# Patient Record
Sex: FEMALE | Race: BLACK OR AFRICAN AMERICAN | NOT HISPANIC OR LATINO | Employment: OTHER | URBAN - METROPOLITAN AREA
[De-identification: names, ages, dates, MRNs, and addresses within clinical notes are randomized per-mention and may not be internally consistent; named-entity substitution may affect disease eponyms.]

---

## 2017-05-23 ENCOUNTER — HOSPITAL ENCOUNTER (OUTPATIENT)
Dept: RADIOLOGY | Facility: HOSPITAL | Age: 82
Discharge: HOME | End: 2017-05-23
Attending: PODIATRIST
Payer: MEDICARE

## 2017-05-23 ENCOUNTER — OFFICE VISIT (OUTPATIENT)
Dept: PODIATRY | Facility: CLINIC | Age: 82
End: 2017-05-23
Payer: MEDICARE

## 2017-05-23 VITALS
WEIGHT: 137 LBS | HEIGHT: 63 IN | HEART RATE: 86 BPM | BODY MASS INDEX: 24.27 KG/M2 | DIASTOLIC BLOOD PRESSURE: 81 MMHG | SYSTOLIC BLOOD PRESSURE: 105 MMHG

## 2017-05-23 DIAGNOSIS — I73.9 PVD (PERIPHERAL VASCULAR DISEASE): ICD-10-CM

## 2017-05-23 DIAGNOSIS — M79.671 RIGHT FOOT PAIN: Primary | ICD-10-CM

## 2017-05-23 DIAGNOSIS — B35.1 DERMATOPHYTOSIS OF NAIL: Primary | ICD-10-CM

## 2017-05-23 DIAGNOSIS — E11.42 DM TYPE 2 WITH DIABETIC PERIPHERAL NEUROPATHY: ICD-10-CM

## 2017-05-23 DIAGNOSIS — M79.671 RIGHT FOOT PAIN: ICD-10-CM

## 2017-05-23 PROCEDURE — 1159F MED LIST DOCD IN RCRD: CPT | Mod: S$GLB,,, | Performed by: PODIATRIST

## 2017-05-23 PROCEDURE — 11721 DEBRIDE NAIL 6 OR MORE: CPT | Mod: Q8,S$GLB,, | Performed by: PODIATRIST

## 2017-05-23 PROCEDURE — 99999 PR PBB SHADOW E&M-NEW PATIENT-LVL III: CPT | Mod: PBBFAC,,, | Performed by: PODIATRIST

## 2017-05-23 PROCEDURE — 1125F AMNT PAIN NOTED PAIN PRSNT: CPT | Mod: S$GLB,,, | Performed by: PODIATRIST

## 2017-05-23 PROCEDURE — 99203 OFFICE O/P NEW LOW 30 MIN: CPT | Mod: 25,S$GLB,, | Performed by: PODIATRIST

## 2017-05-23 PROCEDURE — 1160F RVW MEDS BY RX/DR IN RCRD: CPT | Mod: S$GLB,,, | Performed by: PODIATRIST

## 2017-05-23 PROCEDURE — 73630 X-RAY EXAM OF FOOT: CPT | Mod: 26,RT,, | Performed by: RADIOLOGY

## 2017-05-23 RX ORDER — SULFAMETHOXAZOLE AND TRIMETHOPRIM 400; 80 MG/1; MG/1
1 TABLET ORAL 2 TIMES DAILY
COMMUNITY
End: 2018-03-05

## 2017-05-23 RX ORDER — GABAPENTIN 300 MG/1
300 CAPSULE ORAL 3 TIMES DAILY
COMMUNITY
End: 2018-03-05

## 2017-05-23 RX ORDER — CYCLOBENZAPRINE HCL 10 MG
10 TABLET ORAL 3 TIMES DAILY PRN
COMMUNITY
End: 2018-11-08 | Stop reason: SDUPTHER

## 2017-05-23 RX ORDER — FERROUS GLUCONATE 324(38)MG
324 TABLET ORAL
COMMUNITY
End: 2017-11-01

## 2017-05-23 RX ORDER — CHOLECALCIFEROL (VITAMIN D3) 1250 MCG
TABLET ORAL
COMMUNITY
End: 2018-03-05

## 2017-05-23 RX ORDER — CLOPIDOGREL BISULFATE 75 MG/1
75 TABLET ORAL DAILY
COMMUNITY
End: 2017-11-10 | Stop reason: SDUPTHER

## 2017-05-23 RX ORDER — HYDROCODONE BITARTRATE AND ACETAMINOPHEN 7.5; 325 MG/1; MG/1
1 TABLET ORAL EVERY 6 HOURS PRN
COMMUNITY
End: 2019-03-22

## 2017-05-23 RX ORDER — FUROSEMIDE 20 MG/1
20 TABLET ORAL 2 TIMES DAILY
COMMUNITY
End: 2018-02-19 | Stop reason: SDUPTHER

## 2017-05-23 RX ORDER — ATORVASTATIN CALCIUM 10 MG/1
20 TABLET, FILM COATED ORAL DAILY
COMMUNITY
End: 2017-11-18 | Stop reason: SDUPTHER

## 2017-05-23 NOTE — PROGRESS NOTES
[facility]  PODIATRIC MEDICINE AND SURGERY  PROGRESS NOTE  5/23/2017    PODIATRY NOTE  PCP: Dr. Sabrina Mendoza MD    CHIEF COMPLAINT   Chief Complaint   Patient presents with    Foot Pain     right foot pain mostly in toes current pain 8/10 worse at night describes and numbing, sharp pain    Diabetes Mellitus     PCP Dr. ZAIRA Mendoza       HPI  Concepcion Wright is a 82 y.o. female who has a past medical history of Diabetes mellitus; Hyperlipidemia; Hypertension; and Spinal stenosis.     Concepcion presents to clinic today complaining of right foot pain.    Patient describes pain as:   Location: right foot toes- primarily great toe   Quality: sharp shooting and numbness   Severity:8/10  Duration:several months   Modifying Factors (Aggravating): worse at night   Modifying Factors (Alleviating): no treatment. She was referred by her PCP for further evaluation. She is scheduled to see vascular surgeon due to poor blood flow. She has had two bypasses. She complains about thickened elongated toenails. She states great toenail on right foot particularly painful.     Patient denies other pedal complaints at this time.      PMH  Past Medical History:   Diagnosis Date    Diabetes mellitus     Hyperlipidemia     Hypertension     Spinal stenosis        PROBLEM LIST  There are no active problems to display for this patient.      MEDS  No current outpatient prescriptions on file prior to visit.     No current facility-administered medications on file prior to visit.        Medication List with Changes/Refills   Current Medications    AMLODIPINE BESYLATE/BENAZEPRIL (AMLODIPINE-BENAZEPRIL ORAL)    Take 10 mg by mouth.    ATORVASTATIN (LIPITOR) 10 MG TABLET    Take 10 mg by mouth once daily.    CHOLECALCIFEROL, VITAMIN D3, 50,000 UNIT TAB    Take by mouth.    CLOPIDOGREL (PLAVIX) 75 MG TABLET    Take 75 mg by mouth once daily.    CYCLOBENZAPRINE (FLEXERIL) 10 MG TABLET    Take 10 mg by mouth 3 (three) times daily  "as needed for Muscle spasms.    FERROUS GLUCONATE (FERGON) 324 MG TABLET    Take 324 mg by mouth daily with breakfast.    FUROSEMIDE (LASIX) 20 MG TABLET    Take 20 mg by mouth 2 (two) times daily.    GABAPENTIN (NEURONTIN) 300 MG CAPSULE    Take 300 mg by mouth 3 (three) times daily.    HYDROCODONE-ACETAMINOPHEN 7.5-325MG (NORCO) 7.5-325 MG PER TABLET    Take 1 tablet by mouth every 6 (six) hours as needed for Pain.    LINAGLIPTIN (TRADJENTA) 5 MG TAB TABLET    Take 5 mg by mouth once daily.    POTASSIUM CHLORIDE ORAL    Take by mouth.    SULFAMETHOXAZOLE-TRIMETHOPRIM 400-80MG (BACTRIM,SEPTRA) 400-80 MG PER TABLET    Take 1 tablet by mouth 2 (two) times daily.       PSH   History reviewed. No pertinent surgical history.     ALL  Review of patient's allergies indicates:  No Known Allergies    SOC     Social History   Substance Use Topics    Smoking status: Former Smoker    Smokeless tobacco: Not on file    Alcohol use Not on file         FAMILY HX  History reviewed. No pertinent family history.         REVIEW OF SYSTEMS  General: Denies any fever or chills  Chest: Denies shortness of breath, wheezing, coughing, or sputum production  Heart: Denies chest pain, cold extremities, orthopenia, or reduced exercise tolerance  As noted above and per history of current illness above, otherwise negative in the remainder of the 14 systems.     PHYSICAL EXAM  Vitals:    05/23/17 1515   BP: 105/81   Pulse: 86   Weight: 62.1 kg (137 lb)   Height: 5' 3" (1.6 m)   PainSc:   8   PainLoc: Foot       General: This patient is well-developed, well-nourished and appears stated age, well-oriented to person, place and time, and cooperative and pleasant on today's visit      LOWER EXTREMITY  Vascular exam:   · Dorsalis pedis and posterior tibial pulses palpable 0/4 bilaterally.   · Capillary refill time immediate to the toes.   · Feet are warm to the touch. Skin temperature warm to warm from proximally to distally   · There are " varicosities, telangiectasias noted to bilateral foot and ankle regions.   · There are no ecchymoses noted to bilateral foot and ankle regions.   · There is no gross lower extremity edema.    Dermatologic exam:   · Skin moist with healthy texture and turgor.  · There are no open ulcerations, lacerations, or fissures to bilateral foot and ankle regions. There are no signs of infection as there is no erythema, no proximal-extending lymphangiitis, no fluctuance, or crepitus noted on palpation to bilateral foot and ankle regions.   · There is no interdigital maceration.   · There are no hyperkeratotic lesions noted to feet. Nails are thickened discolored x 10     Neurologic exam:  · Epicritic sensation is intact as the patient is able to sense light touch to bilateral foot and ankle regions.   · Achilles and patellar deep tendon reflexes intact  · Babinski reflex absent    Musculoskeletal/Orthopedic exam:   · No symptomatic structural abnormalities noted  · Muscle strength AT/EHL/EDL/PT: 5/5; Achilles/Gastroc/Soleus: 5/5; PB/PL: 5/5 Muscle tone is normal.  · Ankle joint ROM  B/L limited DF/PF, non-crepitus      IMAGING   Reviewed by me and I agree with radiologist findings, 3 views of foot/ankle, reveal:  No results found for this or any previous visit.        No results found for this or any previous visit.    No results found for this or any previous visit.     Results for orders placed during the hospital encounter of 05/23/17   X-Ray Foot Complete Right    Narrative Right foot three views.    Findings: There is generalized osteopenia.  Mild multiarticular degenerative changes noted.  Tiny dorsal calcaneal spur.  No acute fracture or dislocation.    Impression  As above.      Electronically signed by: CHARITY TRAORE MD  Date:     05/23/17  Time:    15:06            ASSESSMENT  Dermatophytosis of nail    DM type 2 with diabetic peripheral neuropathy    PVD (peripheral vascular disease)        PLAN    1. Patient was  educated about clinical and imaging findings, and verbalizes understanding of above.  2. Treatment plan: With patient's permission, nails were aggressively reduced and debrided x 10 to their soft tissue attachment mechanically and with electric , removing all offending nail and debris. Patient relates relief following the procedure.   -continue with oral gabapentin for diabetic neuropathy -Prescription for neuropathic pain prescribed for patient consisting of: Flurbiprofen 10%, Amitriptyline 2%, Gabapentin 6%, lidocaine 2%, and prilocaine 2% in lipoderm active max. An outside pharmacist will contact patient with information on access. Pt instructed to apply 1.6 grams (1 pump) to painful areas up to 5 times daily-max dosage per day is 8 grams. Pt instructed to apply as instructed and discontinue if any adverse effects as listed on drug information sheet.  -follow up with vascular surgeon and non invasive study scheduled DINORAH  3. RTC  for follow up/evaluation as scheduled       No future appointments.    Report Electronically Signed By:  Lyndsay Zhang DPM   Podiatric Medicine & Surgery  [facility] [place]  5/23/2017

## 2017-10-09 ENCOUNTER — TELEPHONE (OUTPATIENT)
Dept: NEPHROLOGY | Facility: CLINIC | Age: 82
End: 2017-10-09

## 2017-10-09 PROBLEM — E61.1 IRON DEFICIENCY: Status: ACTIVE | Noted: 2017-10-09

## 2017-10-09 NOTE — TELEPHONE ENCOUNTER
----- Message from Lisa Gifford sent at 10/9/2017 10:44 AM CDT -----  Contact: Kailyn ( Dr Rodriguez office )   Kailyn ( Dr Rodriguez office ) is requesting a call from nurse to schedule a new pt apt before December.      Please call pt back at [phone]

## 2017-10-09 NOTE — TELEPHONE ENCOUNTER
Returned call to pt. Advised that 12/7 is next available for Dr. ROXY Villarreal advised that she can be placed on a wait list

## 2017-10-10 ENCOUNTER — HOSPITAL ENCOUNTER (OUTPATIENT)
Dept: RADIOLOGY | Facility: HOSPITAL | Age: 82
Discharge: HOME | End: 2017-10-10
Attending: INTERNAL MEDICINE
Payer: MEDICARE

## 2017-10-10 DIAGNOSIS — R41.3 MEMORY LOSS: ICD-10-CM

## 2017-10-10 PROCEDURE — 70450 CT HEAD/BRAIN W/O DYE: CPT | Mod: 26,,, | Performed by: RADIOLOGY

## 2017-10-10 PROCEDURE — 70450 CT HEAD/BRAIN W/O DYE: CPT | Mod: TC,PO

## 2017-10-18 PROBLEM — N18.30 CHRONIC KIDNEY DISEASE (CKD), STAGE III (MODERATE): Status: ACTIVE | Noted: 2017-10-18

## 2017-10-18 PROBLEM — R80.9 PROTEINURIA: Status: ACTIVE | Noted: 2017-10-18

## 2018-03-06 PROBLEM — R09.89 BILATERAL CAROTID BRUITS: Status: ACTIVE | Noted: 2018-03-06

## 2018-03-06 PROBLEM — E11.9 DIABETES MELLITUS WITHOUT COMPLICATION: Status: ACTIVE | Noted: 2018-03-06

## 2018-03-06 PROBLEM — D64.9 ANEMIA: Status: ACTIVE | Noted: 2018-03-06

## 2018-03-06 PROBLEM — I10 ESSENTIAL HYPERTENSION, BENIGN: Status: ACTIVE | Noted: 2018-03-06

## 2018-03-06 PROBLEM — E55.9 VITAMIN D DEFICIENCY: Status: ACTIVE | Noted: 2018-03-06

## 2018-03-07 PROBLEM — I65.22 CAROTID STENOSIS, LEFT: Status: ACTIVE | Noted: 2018-03-07

## 2018-03-26 ENCOUNTER — TELEPHONE (OUTPATIENT)
Dept: NEUROLOGY | Facility: CLINIC | Age: 83
End: 2018-03-26

## 2018-03-26 NOTE — TELEPHONE ENCOUNTER
----- Message from Charissa Anglin sent at 3/26/2018  9:08 AM CDT -----  Contact: pt daughter Myrna  NP- Myrna is requesting an appt for the patient on today for headaches. Myrna was advised of availability. Please adv/call [phone].//thanks. cw

## 2018-04-06 ENCOUNTER — HOSPITAL ENCOUNTER (OUTPATIENT)
Facility: HOSPITAL | Age: 83
Discharge: HOME | End: 2018-04-08
Attending: EMERGENCY MEDICINE | Admitting: INTERNAL MEDICINE
Payer: MEDICARE

## 2018-04-06 ENCOUNTER — HOSPITAL ENCOUNTER (OUTPATIENT)
Dept: RADIOLOGY | Facility: HOSPITAL | Age: 83
Discharge: HOME | End: 2018-04-06
Attending: PSYCHIATRY & NEUROLOGY
Payer: MEDICARE

## 2018-04-06 ENCOUNTER — DOCUMENTATION ONLY (OUTPATIENT)
Dept: NEUROLOGY | Facility: CLINIC | Age: 83
End: 2018-04-06

## 2018-04-06 ENCOUNTER — OFFICE VISIT (OUTPATIENT)
Dept: NEUROLOGY | Facility: CLINIC | Age: 83
End: 2018-04-06
Payer: MEDICARE

## 2018-04-06 VITALS
WEIGHT: 146.19 LBS | BODY MASS INDEX: 25.9 KG/M2 | HEART RATE: 84 BPM | SYSTOLIC BLOOD PRESSURE: 138 MMHG | RESPIRATION RATE: 20 BRPM | HEIGHT: 63 IN | DIASTOLIC BLOOD PRESSURE: 60 MMHG

## 2018-04-06 DIAGNOSIS — M31.6 TEMPORAL ARTERITIS: ICD-10-CM

## 2018-04-06 DIAGNOSIS — I77.6 ARTERITIS: ICD-10-CM

## 2018-04-06 DIAGNOSIS — I67.7 CEREBRAL ARTERITIS, NOT ELSEWHERE CLASSIFIED: ICD-10-CM

## 2018-04-06 DIAGNOSIS — R51.9 LEFT TEMPORAL HEADACHE: ICD-10-CM

## 2018-04-06 DIAGNOSIS — R51.9 HEADACHE: Primary | ICD-10-CM

## 2018-04-06 LAB
BASOPHILS # BLD AUTO: 0.04 K/UL
BASOPHILS NFR BLD: 0.6 %
DIFFERENTIAL METHOD: ABNORMAL
EOSINOPHIL # BLD AUTO: 0.2 K/UL
EOSINOPHIL NFR BLD: 3 %
ERYTHROCYTE [DISTWIDTH] IN BLOOD BY AUTOMATED COUNT: 14.9 %
HCT VFR BLD AUTO: 35.1 %
HGB BLD-MCNC: 11 G/DL
LYMPHOCYTES # BLD AUTO: 1.8 K/UL
LYMPHOCYTES NFR BLD: 29 %
MCH RBC QN AUTO: 31.3 PG
MCHC RBC AUTO-ENTMCNC: 31.3 G/DL
MCV RBC AUTO: 100 FL
MONOCYTES # BLD AUTO: 0.7 K/UL
MONOCYTES NFR BLD: 11.1 %
NEUTROPHILS # BLD AUTO: 3.6 K/UL
NEUTROPHILS NFR BLD: 56.3 %
PLATELET # BLD AUTO: 321 K/UL
PMV BLD AUTO: 10 FL
RBC # BLD AUTO: 3.51 M/UL
WBC # BLD AUTO: 6.32 K/UL

## 2018-04-06 PROCEDURE — 96372 THER/PROPH/DIAG INJ SC/IM: CPT | Mod: 59

## 2018-04-06 PROCEDURE — 85730 THROMBOPLASTIN TIME PARTIAL: CPT

## 2018-04-06 PROCEDURE — 3075F SYST BP GE 130 - 139MM HG: CPT | Mod: CPTII,S$GLB,, | Performed by: PSYCHIATRY & NEUROLOGY

## 2018-04-06 PROCEDURE — 99285 EMERGENCY DEPT VISIT HI MDM: CPT | Mod: 25

## 2018-04-06 PROCEDURE — 99999 PR PBB SHADOW E&M-EST. PATIENT-LVL IV: CPT | Mod: PBBFAC,,, | Performed by: PSYCHIATRY & NEUROLOGY

## 2018-04-06 PROCEDURE — 84100 ASSAY OF PHOSPHORUS: CPT

## 2018-04-06 PROCEDURE — 83735 ASSAY OF MAGNESIUM: CPT

## 2018-04-06 PROCEDURE — 99205 OFFICE O/P NEW HI 60 MIN: CPT | Mod: S$GLB,,, | Performed by: PSYCHIATRY & NEUROLOGY

## 2018-04-06 PROCEDURE — 96365 THER/PROPH/DIAG IV INF INIT: CPT

## 2018-04-06 PROCEDURE — 82962 GLUCOSE BLOOD TEST: CPT

## 2018-04-06 PROCEDURE — 80053 COMPREHEN METABOLIC PANEL: CPT

## 2018-04-06 PROCEDURE — 93880 EXTRACRANIAL BILAT STUDY: CPT | Mod: TC

## 2018-04-06 PROCEDURE — 85610 PROTHROMBIN TIME: CPT

## 2018-04-06 PROCEDURE — 85025 COMPLETE CBC W/AUTO DIFF WBC: CPT

## 2018-04-06 PROCEDURE — 3078F DIAST BP <80 MM HG: CPT | Mod: CPTII,S$GLB,, | Performed by: PSYCHIATRY & NEUROLOGY

## 2018-04-06 RX ORDER — NAPROXEN SODIUM 220 MG/1
81 TABLET, FILM COATED ORAL DAILY
COMMUNITY

## 2018-04-06 RX ORDER — METHYLPREDNISOLONE 4 MG/1
TABLET ORAL
Qty: 1 PACKAGE | Refills: 0 | Status: ON HOLD | OUTPATIENT
Start: 2018-04-06 | End: 2018-04-08 | Stop reason: HOSPADM

## 2018-04-06 NOTE — LETTER
April 6, 2018      Sabrina Mendoza MD  [address] [place] [address]           [facility]  [address]  [place] [address]  Phone: [phone]          Patient: Concepcion Wright   MR Number: [id number]   YOB: 1935   Date of Visit: 4/6/2018       Dear Dr. Sabrina Mendoza:    Thank you for referring Concepcion Wright to me for evaluation. Attached you will find relevant portions of my assessment and plan of care.    If you have questions, please do not hesitate to call me. I look forward to following Concepcion Wright along with you.    Sincerely,    Ashok Villarreal MD    Enclosure  CC:  No Recipients    If you would like to receive this communication electronically, please contact [email or web address] or [phone] to request more information on [facility] [employer] access.    For providers and/or their staff who would like to refer a patient to [facility], please contact us through our one-stop-shop provider referral line, [facility], at [phone].    If you feel you have received this communication in error or would no longer like to receive these types of communications, please e-mail [email or web address]

## 2018-04-06 NOTE — PROGRESS NOTES
Consult Requested By: No att. providers found  Reason for Consult: Left Temporal Headache    SUBJECTIVE:       HPI:   HISTORY OF PRESENT ILLNESS:  This is an 82-year-old right-handed lady with   history of diabetes, high blood pressure, and also chronic kidney disease,   presented today in the clinic for evaluation of headache in the left temporal,   just behind the left eye.  She said that it is going on for about three weeks.    It is constant pain, but sometimes it gets worse.  Denied any problem with   vision and also no pain in the eye, but she said that her left eye has become a   little reddish and it almonte sometimes.  She has some tenderness in the left   temple area.  No history of injury or trauma.  She said that she is not having   glaucoma.  Her daughter ordered sed rate last time and it came out 20 on   03/05/2018.  She also sees Dr. Lopez for heart problems.  She has cardiac   murmur.      AK/ENMA  dd: 04/06/2018 09:32:12 (CDT)  td: 04/07/2018 03:15:19 (CDT)  Doc ID   #[id number]  Job ID #[id number]    CC:     This office note has been dictated.      Past Medical History:   Diagnosis Date    Anemia     Coronary artery disease     Diabetes mellitus     Diabetes mellitus type I     Hyperlipidemia     Hypertension     PAD (peripheral artery disease)     Spinal stenosis      Past Surgical History:   Procedure Laterality Date    FEMORAL ARTERY STENT       Family History   Problem Relation Age of Onset    Hypertension Mother     Diabetes Mother     Hypertension Father     Diabetes Father     Hypertension Sister     Diabetes Sister     Hypertension Brother     Diabetes Brother     Hypertension Daughter     Diabetes Daughter      Social History   Substance Use Topics    Smoking status: Former Smoker    Smokeless tobacco: Never Used    Alcohol use No     Review of Systems   Constitutional: Negative for fever and weight loss.   HENT: Negative for hearing loss.    Eyes: Negative for blurred vision,  double vision, photophobia and pain.   Respiratory: Negative for cough.    Cardiovascular: Negative for chest pain.   Gastrointestinal: Negative for abdominal pain, nausea and vomiting.   Genitourinary: Negative for dysuria, frequency and urgency.   Musculoskeletal: Negative.  Negative for back pain, falls, joint pain, myalgias and neck pain.   Skin: Negative for itching and rash.   Neurological: Positive for headaches. Negative for tingling.   Psychiatric/Behavioral: Negative for depression and memory loss.         OBJECTIVE:     Vital Signs (Most Recent)  Pulse: 84 (04/06/18 0837)  Resp: 20 (04/06/18 0837)  BP: 138/60 (04/06/18 0837)    Physical Exam   Constitutional: She is oriented to person, place, and time. She appears well-developed and well-nourished.   HENT:   Head: Normocephalic and atraumatic.       Red area of pain and blue area is tender.   Eyes: Conjunctivae, EOM and lids are normal. Pupils are equal, round, and reactive to light. Left eye exhibits chemosis.   Neck: Normal range of motion. Neck supple. No JVD present. No tracheal deviation present. No thyromegaly present.   Cardiovascular: Normal rate and regular rhythm.    Murmur heard.  Pulmonary/Chest: Effort normal and breath sounds normal.   Abdominal: She exhibits no distension. There is no tenderness.   Musculoskeletal: Normal range of motion. She exhibits no edema or tenderness.   Neurological: She is alert and oriented to person, place, and time. She has normal strength and normal reflexes. She displays normal reflexes. A sensory deficit is present. No cranial nerve deficit. She exhibits normal muscle tone. She displays a negative Romberg sign. Coordination and gait normal.   Reflex Scores:       Tricep reflexes are 2+ on the right side and 2+ on the left side.       Bicep reflexes are 2+ on the right side and 2+ on the left side.       Brachioradialis reflexes are 2+ on the right side and 2+ on the left side.       Patellar reflexes are 2+ on  the right side and 2+ on the left side.       Achilles reflexes are 2+ on the right side and 2+ on the left side.  Her vision is ok in all areas.   Skin: Skin is warm and dry. No rash noted.   Psychiatric: She has a normal mood and affect. Her behavior is normal. Judgment and thought content normal.         Strength  Deltoids Triceps Biceps Wrist Extension Wrist Flexion Hand    Upper: R 5/5 5/5 5/5 5/5 5/5 5/5    L 5/5 5/5 5/5 5/5 5/5 5/5     Iliopsoas Quadriceps Knee  Flexion Tibialis  anterior Gastro- cnemius EHL   Lower: R 5/5 5/5 5/5 5/5 5/5 5/5    L 5/5 5/5 5/5 5/5 5/5 5/5     Laboratory:  Lab Results   Component Value Date    WBC 7.8 10/09/2017    HGB 10.8 (L) 10/09/2017    HCT 33.0 (L) 10/09/2017     10/09/2017    CHOL 219 (H) 10/09/2017    TRIG 116 10/09/2017    HDL 67 10/09/2017    ALT 11 02/19/2018    AST 17 02/19/2018     02/19/2018    K 4.4 02/19/2018     02/19/2018    CREATININE 1.42 (H) 02/19/2018    BUN 17 02/19/2018    CO2 23 02/19/2018    TSH 2.000 02/19/2018    HGBA1C 4.9 02/19/2018    MICROALBUR 1,166.4 10/09/2017     Results for ADDY AYERS (MRN [id number]) as of 4/6/2018 09:21   Ref. Range 10/9/2017 10:40 2/19/2018 07:58 3/5/2018 12:16   Vitamin B-12 Latest Ref Range: 211 - 946 pg/mL 433     Iron Saturation Latest Ref Range: 15 - 55 %  9 (LL)    Sed Rate Latest Ref Range: 0 - 40 mm/hr   20         ASSESSMENT/PLAN:     Primary Diagnoses:  Problem List Items Addressed This Visit     Left temporal headache    Overview     She has this headache for 3 weeks. No visual field affected. Sed rate is normal. Possibility of Giant cell arteritis is high with her age and clinical history. We should repeat sed rate and Carotid ultrasound to evaluate external carotid artery in the left.  May be medrol dose pack will help at this point.                  Patient Active Problem List   Diagnosis    Iron deficiency    Chronic kidney disease (CKD), stage III (moderate)    Proteinuria     Diabetes mellitus without complication    Anemia    Essential hypertension, benign    Vitamin D deficiency    Bilateral carotid bruits    Carotid stenosis, left        Plan: will see  In 2 weeks.  Addendum:    About 7;30 pm , result has been checked.  Sed rate : 50 and carotid ultrasound showed  Severe stenosis in ECA.    Talked to ER  Physician Dr. Santillan and decided that patient should go to ER and Dr. Santillan , would take care.   Message was conveyed to the daughter of Mrs. Concepcion Wright .  They understood the seriousness and agreed to do the same.

## 2018-04-07 PROBLEM — R51.9 HEADACHE: Status: ACTIVE | Noted: 2018-04-07

## 2018-04-07 PROBLEM — M31.6 TEMPORAL ARTERITIS: Status: ACTIVE | Noted: 2018-04-07

## 2018-04-07 LAB
ALBUMIN SERPL BCP-MCNC: 3.4 G/DL
ALBUMIN SERPL BCP-MCNC: 3.6 G/DL
ALP SERPL-CCNC: 68 U/L
ALP SERPL-CCNC: 69 U/L
ALT SERPL W/O P-5'-P-CCNC: 13 U/L
ALT SERPL W/O P-5'-P-CCNC: 13 U/L
ANION GAP SERPL CALC-SCNC: 10 MMOL/L
ANION GAP SERPL CALC-SCNC: 9 MMOL/L
APTT BLDCRRT: 24.2 SEC
AST SERPL-CCNC: 20 U/L
AST SERPL-CCNC: 23 U/L
BASOPHILS # BLD AUTO: 0.01 K/UL
BASOPHILS NFR BLD: 0.1 %
BILIRUB SERPL-MCNC: 0.5 MG/DL
BILIRUB SERPL-MCNC: 0.6 MG/DL
BILIRUB UR QL STRIP: NEGATIVE
BILIRUB UR QL STRIP: NEGATIVE
BUN SERPL-MCNC: 19 MG/DL
BUN SERPL-MCNC: 20 MG/DL
CALCIUM SERPL-MCNC: 9.3 MG/DL
CALCIUM SERPL-MCNC: 9.6 MG/DL
CHLORIDE SERPL-SCNC: 103 MMOL/L
CHLORIDE SERPL-SCNC: 105 MMOL/L
CHOLEST SERPL-MCNC: 162 MG/DL
CHOLEST/HDLC SERPL: 2.8 {RATIO}
CLARITY UR: CLEAR
CLARITY UR: CLEAR
CO2 SERPL-SCNC: 25 MMOL/L
CO2 SERPL-SCNC: 26 MMOL/L
COLOR UR: YELLOW
COLOR UR: YELLOW
CREAT SERPL-MCNC: 1.4 MG/DL
CREAT SERPL-MCNC: 1.5 MG/DL
CRP SERPL-MCNC: 7.6 MG/L
DIFFERENTIAL METHOD: ABNORMAL
EOSINOPHIL # BLD AUTO: 0 K/UL
EOSINOPHIL NFR BLD: 0.5 %
ERYTHROCYTE [DISTWIDTH] IN BLOOD BY AUTOMATED COUNT: 15 %
ERYTHROCYTE [SEDIMENTATION RATE] IN BLOOD BY WESTERGREN METHOD: 58 MM/HR
EST. GFR  (AFRICAN AMERICAN): 38 ML/MIN/1.73 M^2
EST. GFR  (AFRICAN AMERICAN): 41 ML/MIN/1.73 M^2
EST. GFR  (NON AFRICAN AMERICAN): 33 ML/MIN/1.73 M^2
EST. GFR  (NON AFRICAN AMERICAN): 36 ML/MIN/1.73 M^2
ESTIMATED AVG GLUCOSE: 100 MG/DL
GLUCOSE SERPL-MCNC: 110 MG/DL
GLUCOSE SERPL-MCNC: 122 MG/DL
GLUCOSE SERPL-MCNC: 90 MG/DL (ref 70–110)
GLUCOSE UR QL STRIP: NEGATIVE
GLUCOSE UR QL STRIP: NEGATIVE
HBA1C MFR BLD HPLC: 5.1 %
HCT VFR BLD AUTO: 36.2 %
HDLC SERPL-MCNC: 58 MG/DL
HDLC SERPL: 35.8 %
HGB BLD-MCNC: 11.6 G/DL
HGB UR QL STRIP: ABNORMAL
HGB UR QL STRIP: ABNORMAL
INR PPP: 1
KETONES UR QL STRIP: NEGATIVE
KETONES UR QL STRIP: NEGATIVE
LDLC SERPL CALC-MCNC: 87.8 MG/DL
LEUKOCYTE ESTERASE UR QL STRIP: NEGATIVE
LEUKOCYTE ESTERASE UR QL STRIP: NEGATIVE
LYMPHOCYTES # BLD AUTO: 0.8 K/UL
LYMPHOCYTES NFR BLD: 10.6 %
MAGNESIUM SERPL-MCNC: 2 MG/DL
MCH RBC QN AUTO: 32 PG
MCHC RBC AUTO-ENTMCNC: 32 G/DL
MCV RBC AUTO: 100 FL
MONOCYTES # BLD AUTO: 0.1 K/UL
MONOCYTES NFR BLD: 1.9 %
NEUTROPHILS # BLD AUTO: 6.4 K/UL
NEUTROPHILS NFR BLD: 86.9 %
NITRITE UR QL STRIP: NEGATIVE
NITRITE UR QL STRIP: NEGATIVE
NONHDLC SERPL-MCNC: 104 MG/DL
PH UR STRIP: 7 [PH] (ref 5–8)
PH UR STRIP: 7 [PH] (ref 5–8)
PHOSPHATE SERPL-MCNC: 3.2 MG/DL
PLATELET # BLD AUTO: 316 K/UL
PMV BLD AUTO: 10.1 FL
POCT GLUCOSE: 156 MG/DL (ref 70–110)
POCT GLUCOSE: 352 MG/DL (ref 70–110)
POCT GLUCOSE: 90 MG/DL (ref 70–110)
POTASSIUM SERPL-SCNC: 3.8 MMOL/L
POTASSIUM SERPL-SCNC: 3.8 MMOL/L
PROT SERPL-MCNC: 8.1 G/DL
PROT SERPL-MCNC: 8.5 G/DL
PROT UR QL STRIP: ABNORMAL
PROT UR QL STRIP: ABNORMAL
PROTHROMBIN TIME: 10.2 SEC
RBC # BLD AUTO: 3.62 M/UL
SODIUM SERPL-SCNC: 139 MMOL/L
SODIUM SERPL-SCNC: 139 MMOL/L
SP GR UR STRIP: 1.01 (ref 1–1.03)
SP GR UR STRIP: 1.01 (ref 1–1.03)
TRIGL SERPL-MCNC: 81 MG/DL
URN SPEC COLLECT METH UR: ABNORMAL
URN SPEC COLLECT METH UR: ABNORMAL
UROBILINOGEN UR STRIP-ACNC: NEGATIVE EU/DL
UROBILINOGEN UR STRIP-ACNC: NEGATIVE EU/DL
WBC # BLD AUTO: 7.33 K/UL

## 2018-04-07 PROCEDURE — 80053 COMPREHEN METABOLIC PANEL: CPT

## 2018-04-07 PROCEDURE — 81003 URINALYSIS AUTO W/O SCOPE: CPT

## 2018-04-07 PROCEDURE — 25500020 PHARM REV CODE 255: Performed by: INTERNAL MEDICINE

## 2018-04-07 PROCEDURE — 36415 COLL VENOUS BLD VENIPUNCTURE: CPT

## 2018-04-07 PROCEDURE — 83036 HEMOGLOBIN GLYCOSYLATED A1C: CPT

## 2018-04-07 PROCEDURE — 25000003 PHARM REV CODE 250: Performed by: EMERGENCY MEDICINE

## 2018-04-07 PROCEDURE — 63600175 PHARM REV CODE 636 W HCPCS: Performed by: PSYCHIATRY & NEUROLOGY

## 2018-04-07 PROCEDURE — 85025 COMPLETE CBC W/AUTO DIFF WBC: CPT

## 2018-04-07 PROCEDURE — 86140 C-REACTIVE PROTEIN: CPT

## 2018-04-07 PROCEDURE — G0378 HOSPITAL OBSERVATION PER HR: HCPCS

## 2018-04-07 PROCEDURE — 63600175 PHARM REV CODE 636 W HCPCS: Performed by: EMERGENCY MEDICINE

## 2018-04-07 PROCEDURE — 80061 LIPID PANEL: CPT

## 2018-04-07 PROCEDURE — 85651 RBC SED RATE NONAUTOMATED: CPT

## 2018-04-07 PROCEDURE — 82962 GLUCOSE BLOOD TEST: CPT | Mod: 91

## 2018-04-07 PROCEDURE — 25000003 PHARM REV CODE 250: Performed by: NURSE PRACTITIONER

## 2018-04-07 RX ORDER — HYDROCODONE BITARTRATE AND ACETAMINOPHEN 5; 325 MG/1; MG/1
1 TABLET ORAL EVERY 4 HOURS PRN
Status: DISCONTINUED | OUTPATIENT
Start: 2018-04-07 | End: 2018-04-08 | Stop reason: HOSPADM

## 2018-04-07 RX ORDER — MORPHINE SULFATE 4 MG/ML
4 INJECTION, SOLUTION INTRAMUSCULAR; INTRAVENOUS EVERY 4 HOURS PRN
Status: DISCONTINUED | OUTPATIENT
Start: 2018-04-07 | End: 2018-04-07

## 2018-04-07 RX ORDER — CLOPIDOGREL BISULFATE 75 MG/1
75 TABLET ORAL DAILY
Status: DISCONTINUED | OUTPATIENT
Start: 2018-04-07 | End: 2018-04-08 | Stop reason: HOSPADM

## 2018-04-07 RX ORDER — SODIUM CHLORIDE 0.9 % (FLUSH) 0.9 %
5 SYRINGE (ML) INJECTION
Status: DISCONTINUED | OUTPATIENT
Start: 2018-04-07 | End: 2018-04-08 | Stop reason: HOSPADM

## 2018-04-07 RX ORDER — AMLODIPINE BESYLATE 10 MG/1
10 TABLET ORAL DAILY
Status: DISCONTINUED | OUTPATIENT
Start: 2018-04-07 | End: 2018-04-08 | Stop reason: HOSPADM

## 2018-04-07 RX ORDER — ATORVASTATIN CALCIUM 40 MG/1
80 TABLET, FILM COATED ORAL DAILY
Status: DISCONTINUED | OUTPATIENT
Start: 2018-04-08 | End: 2018-04-08 | Stop reason: HOSPADM

## 2018-04-07 RX ORDER — ATORVASTATIN CALCIUM 10 MG/1
20 TABLET, FILM COATED ORAL DAILY
Status: DISCONTINUED | OUTPATIENT
Start: 2018-04-07 | End: 2018-04-07

## 2018-04-07 RX ORDER — NAPROXEN SODIUM 220 MG/1
81 TABLET, FILM COATED ORAL DAILY
Status: DISCONTINUED | OUTPATIENT
Start: 2018-04-07 | End: 2018-04-08 | Stop reason: HOSPADM

## 2018-04-07 RX ORDER — GLUCAGON 1 MG
1 KIT INJECTION
Status: DISCONTINUED | OUTPATIENT
Start: 2018-04-07 | End: 2018-04-08 | Stop reason: HOSPADM

## 2018-04-07 RX ORDER — HYDROCODONE BITARTRATE AND ACETAMINOPHEN 5; 325 MG/1; MG/1
1 TABLET ORAL EVERY 4 HOURS PRN
Status: DISCONTINUED | OUTPATIENT
Start: 2018-04-07 | End: 2018-04-07

## 2018-04-07 RX ORDER — BENAZEPRIL HYDROCHLORIDE 20 MG/1
20 TABLET ORAL DAILY
Status: DISCONTINUED | OUTPATIENT
Start: 2018-04-07 | End: 2018-04-08 | Stop reason: HOSPADM

## 2018-04-07 RX ORDER — METHYLPREDNISOLONE SOD SUCC 125 MG
500 VIAL (EA) INJECTION EVERY 12 HOURS
Status: DISCONTINUED | OUTPATIENT
Start: 2018-04-07 | End: 2018-04-07

## 2018-04-07 RX ORDER — IBUPROFEN 200 MG
24 TABLET ORAL
Status: DISCONTINUED | OUTPATIENT
Start: 2018-04-07 | End: 2018-04-08 | Stop reason: HOSPADM

## 2018-04-07 RX ORDER — AMLODIPINE AND BENAZEPRIL HYDROCHLORIDE 10; 20 MG/1; MG/1
1 CAPSULE ORAL DAILY
Status: DISCONTINUED | OUTPATIENT
Start: 2018-04-07 | End: 2018-04-07 | Stop reason: CLARIF

## 2018-04-07 RX ORDER — PREDNISONE 20 MG/1
60 TABLET ORAL DAILY
Status: DISCONTINUED | OUTPATIENT
Start: 2018-04-07 | End: 2018-04-08 | Stop reason: HOSPADM

## 2018-04-07 RX ORDER — INSULIN ASPART 100 [IU]/ML
1-10 INJECTION, SOLUTION INTRAVENOUS; SUBCUTANEOUS
Status: DISCONTINUED | OUTPATIENT
Start: 2018-04-07 | End: 2018-04-08 | Stop reason: HOSPADM

## 2018-04-07 RX ORDER — ACETAMINOPHEN 325 MG/1
650 TABLET ORAL EVERY 8 HOURS PRN
Status: DISCONTINUED | OUTPATIENT
Start: 2018-04-07 | End: 2018-04-08 | Stop reason: HOSPADM

## 2018-04-07 RX ORDER — IBUPROFEN 200 MG
16 TABLET ORAL
Status: DISCONTINUED | OUTPATIENT
Start: 2018-04-07 | End: 2018-04-08 | Stop reason: HOSPADM

## 2018-04-07 RX ORDER — FUROSEMIDE 20 MG/1
20 TABLET ORAL DAILY
Status: DISCONTINUED | OUTPATIENT
Start: 2018-04-07 | End: 2018-04-08 | Stop reason: HOSPADM

## 2018-04-07 RX ORDER — ENOXAPARIN SODIUM 100 MG/ML
30 INJECTION SUBCUTANEOUS EVERY 24 HOURS
Status: DISCONTINUED | OUTPATIENT
Start: 2018-04-07 | End: 2018-04-08 | Stop reason: HOSPADM

## 2018-04-07 RX ADMIN — HYDROCODONE BITARTRATE AND ACETAMINOPHEN 1 TABLET: 5; 325 TABLET ORAL at 11:04

## 2018-04-07 RX ADMIN — DEXTROSE MONOHYDRATE: 5 INJECTION, SOLUTION INTRAVENOUS at 02:04

## 2018-04-07 RX ADMIN — CLOPIDOGREL BISULFATE 75 MG: 75 TABLET ORAL at 09:04

## 2018-04-07 RX ADMIN — INSULIN ASPART 1 UNITS: 100 INJECTION, SOLUTION INTRAVENOUS; SUBCUTANEOUS at 09:04

## 2018-04-07 RX ADMIN — IOHEXOL 100 ML: 350 INJECTION, SOLUTION INTRAVENOUS at 11:04

## 2018-04-07 RX ADMIN — ENOXAPARIN SODIUM 30 MG: 100 INJECTION SUBCUTANEOUS at 06:04

## 2018-04-07 RX ADMIN — INSULIN ASPART 10 UNITS: 100 INJECTION, SOLUTION INTRAVENOUS; SUBCUTANEOUS at 09:04

## 2018-04-07 RX ADMIN — ATORVASTATIN CALCIUM 20 MG: 10 TABLET, FILM COATED ORAL at 09:04

## 2018-04-07 RX ADMIN — ASPIRIN 81 MG CHEWABLE TABLET 81 MG: 81 TABLET CHEWABLE at 09:04

## 2018-04-07 RX ADMIN — FUROSEMIDE 20 MG: 20 TABLET ORAL at 09:04

## 2018-04-07 RX ADMIN — PREDNISONE 60 MG: 20 TABLET ORAL at 11:04

## 2018-04-07 RX ADMIN — AMLODIPINE BESYLATE 10 MG: 10 TABLET ORAL at 09:04

## 2018-04-07 RX ADMIN — HYDROCODONE BITARTRATE AND ACETAMINOPHEN 1 TABLET: 5; 325 TABLET ORAL at 09:04

## 2018-04-07 RX ADMIN — BENAZEPRIL HYDROCHLORIDE 20 MG: 20 TABLET, FILM COATED ORAL at 09:04

## 2018-04-07 NOTE — HPI
Concepcion Wright is a 82 y.o. female patient who presents for left temporal HA.  Onset about 1 month ago and continuing to worsen. Patient reports HA is intermittent, randomly coming and going. No modifying factors. Associated symptoms: Left eye tearing. Pt was evaluated by Dr. Villarreal (neurology)  PTA and referred to ED for carotid US.  No further complaints or concerns at this time. The patient was evaluated in the ER. CT of Head without Contrast per virtual:NAF. Bilateral Carotid US report pending. Of Significance ESR done in clinic PTA >50. Patient with high concern for GCA per neurology, who discussed case with ER. Hospital Medicine consulted. High Dose IV Steroids Started in ER and continued. Patient placed in obs for suspected Temporal GCA.

## 2018-04-07 NOTE — ASSESSMENT & PLAN NOTE
ESR elevated  Continue Solumedrol 500mg IV BID   Monitor for visual changes   Consider Temporal Arteritis Biopsy   Neuro on Board, consult   Consider other headache types in diff.

## 2018-04-07 NOTE — PROGRESS NOTES
Pharmacist Renal Dose Adjustment Note    Concepcion Wright is a 82 y.o. female being treated with the medication Enoxaparin    Patient Data:    Vital Signs (Most Recent):  Temp: 99 °F (37.2 °C) (04/06/18 2053)  Pulse: 74 (04/07/18 0037)  Resp: 12 (04/07/18 0037)  BP: 101/60 (04/07/18 0037)  SpO2: 96 % (04/06/18 2053) Vital Signs (72h Range):  Temp:  [99 °F (37.2 °C)]   Pulse:  [74-89]   Resp:  [12-20]   BP: (101-154)/(60-67)   SpO2:  [96 %]        Recent Labs     Lab 04/06/18 2347   CREATININE 1.5*     Serum creatinine: 1.5 mg/dL (H) 04/06/18 2347  Estimated creatinine clearance: 27.5 mL/min (A)    Medication:Enoxaparin dose: 40 mg frequency daily will be changed to medication:Enoxaparin dose:30 mg frequency:daily    Pharmacist's Name: Noé Barnes  Pharmacist's Extension: [phone]

## 2018-04-07 NOTE — ED PROVIDER NOTES
SCRIBE #1 NOTE: I, Sarahi Rosa, am scribing for, and in the presence of, Jhony Toro MD. I have scribed the entire note.      History      Chief Complaint   Patient presents with    Headache     L sided HA worsening over past 3weeks; seen by Dr Villarreal, neurology, today and sent here for carotid US       Review of patient's allergies indicates:  No Known Allergies     HPI   HPI    4/6/2018, 11:18 PM   History obtained from the patient      History of Present Illness: Concepcion Wright is a 82 y.o. female patient who presents to the Emergency Department for left temporal HA which onset gradually 3 weeks PTA. Symptoms are constant and moderate in severity. Pt was evaluated by Dr. Villarreal (neurology)  today and referred to ED for carotid US. No mitigating or exacerbating factors reported. No associated sxs reported. Patient denies any visual disturbance, photophobia, fever, chills, neck pain/stiffness, extremity weakness/numbness, dizziness, N/V/D, dysuria, and all other sxs at this time. No prior Tx reported. No further complaints or concerns at this time.         Arrival mode: Personal vehicle    PCP: Sabrina Mendoza MD       Past Medical History:  Past Medical History:   Diagnosis Date    Anemia     Coronary artery disease     Diabetes mellitus     Diabetes mellitus type I     Hyperlipidemia     Hypertension     PAD (peripheral artery disease)     Spinal stenosis        Past Surgical History:  Past Surgical History:   Procedure Laterality Date    FEMORAL ARTERY STENT           Family History:  Family History   Problem Relation Age of Onset    Hypertension Mother     Diabetes Mother     Hypertension Father     Diabetes Father     Hypertension Sister     Diabetes Sister     Hypertension Brother     Diabetes Brother     Hypertension Daughter     Diabetes Daughter        Social History:  Social History     Social History Main Topics    Smoking status: Former Smoker    Smokeless tobacco:  Never Used    Alcohol use No    Drug use: No    Sexual activity: unknown       ROS   Review of Systems   Constitutional: Negative for chills and fever.   HENT: Negative for congestion, rhinorrhea and sore throat.    Eyes: Negative for photophobia and visual disturbance.   Respiratory: Negative for cough and shortness of breath.    Cardiovascular: Negative for chest pain.   Gastrointestinal: Negative for diarrhea, nausea and vomiting.   Genitourinary: Negative for dysuria and hematuria.   Musculoskeletal: Negative for back pain, neck pain and neck stiffness.   Neurological: Positive for headaches. Negative for dizziness, speech difficulty, weakness, light-headedness and numbness.       Physical Exam      Initial Vitals [04/06/18 2053]   BP Pulse Resp Temp SpO2   (!) 154/67 89 18 99 °F (37.2 °C) 96 %      MAP       96          Physical Exam  Nursing Notes and Vital Signs Reviewed.  Constitutional: Patient is in no acute distress. Well-developed and well-nourished.  Head: Atraumatic. Normocephalic.  Eyes: PERRL. EOM intact. Conjunctivae are not pale. No scleral icterus.  Neck: Supple. Full ROM. No lymphadenopathy.  Cardiovascular: Regular rate. Regular rhythm. No murmurs, rubs, or gallops. Distal pulses are 2+ and symmetric.  Pulmonary/Chest: No respiratory distress. Clear to auscultation bilaterally. No wheezing or rales.  Abdominal: Soft and non-distended.  There is no tenderness.  Musculoskeletal: Moves all extremities. No obvious deformities. No edema.  Skin: Warm and dry.  Neurological:  Alert, awake, and appropriate.  Normal speech.  No acute focal neurological deficits are appreciated.  Psychiatric: Normal affect. Good eye contact. Appropriate in content.    ED Course    Procedures  ED Vital Signs:  Vitals:    04/06/18 2053 04/07/18 0037 04/07/18 0248   BP: (!) 154/67 101/60 134/63   Pulse: 89 74 86   Resp: 18 12 19   Temp: 99 °F (37.2 °C)     TempSrc: Oral     SpO2: 96%  98%   Weight: 66.9 kg (147 lb 7.8 oz)  "    Height: 5' 3" (1.6 m)         Abnormal Lab Results:  Labs Reviewed   CBC W/ AUTO DIFFERENTIAL - Abnormal; Notable for the following:        Result Value    RBC 3.51 (*)     Hemoglobin 11.0 (*)     Hematocrit 35.1 (*)      (*)     MCH 31.3 (*)     MCHC 31.3 (*)     RDW 14.9 (*)     All other components within normal limits   COMPREHENSIVE METABOLIC PANEL - Abnormal; Notable for the following:     Creatinine 1.5 (*)     Total Protein 8.5 (*)     eGFR if  38 (*)     eGFR if non  33 (*)     All other components within normal limits   URINALYSIS - Abnormal; Notable for the following:     Protein, UA Trace (*)     Occult Blood UA Trace (*)     All other components within normal limits   URINALYSIS - Abnormal; Notable for the following:     Protein, UA Trace (*)     Occult Blood UA Trace (*)     All other components within normal limits   POCT GLUCOSE MONITORING CONTINUOUS - Normal   MAGNESIUM   PHOSPHORUS   PROTIME-INR   APTT   MAGNESIUM   PROTIME-INR   PHOSPHORUS   APTT   HEMOGLOBIN A1C   CBC W/ AUTO DIFFERENTIAL   COMPREHENSIVE METABOLIC PANEL   SEDIMENTATION RATE, MANUAL   POCT GLUCOSE        All Lab Results:  Results for orders placed or performed during the hospital encounter of 04/06/18   CBC auto differential   Result Value Ref Range    WBC 6.32 3.90 - 12.70 K/uL    RBC 3.51 (L) 4.00 - 5.40 M/uL    Hemoglobin 11.0 (L) 12.0 - 16.0 g/dL    Hematocrit 35.1 (L) 37.0 - 48.5 %     (H) 82 - 98 fL    MCH 31.3 (H) 27.0 - 31.0 pg    MCHC 31.3 (L) 32.0 - 36.0 g/dL    RDW 14.9 (H) 11.5 - 14.5 %    Platelets 321 150 - 350 K/uL    MPV 10.0 9.2 - 12.9 fL    Gran # (ANC) 3.6 1.8 - 7.7 K/uL    Lymph # 1.8 1.0 - 4.8 K/uL    Mono # 0.7 0.3 - 1.0 K/uL    Eos # 0.2 0.0 - 0.5 K/uL    Baso # 0.04 0.00 - 0.20 K/uL    Gran% 56.3 38.0 - 73.0 %    Lymph% 29.0 18.0 - 48.0 %    Mono% 11.1 4.0 - 15.0 %    Eosinophil% 3.0 0.0 - 8.0 %    Basophil% 0.6 0.0 - 1.9 %    Differential Method Automated  "   Comprehensive metabolic panel   Result Value Ref Range    Sodium 139 136 - 145 mmol/L    Potassium 3.8 3.5 - 5.1 mmol/L    Chloride 103 95 - 110 mmol/L    CO2 26 23 - 29 mmol/L    Glucose 110 70 - 110 mg/dL    BUN, Bld 20 8 - 23 mg/dL    Creatinine 1.5 (H) 0.5 - 1.4 mg/dL    Calcium 9.6 8.7 - 10.5 mg/dL    Total Protein 8.5 (H) 6.0 - 8.4 g/dL    Albumin 3.6 3.5 - 5.2 g/dL    Total Bilirubin 0.5 0.1 - 1.0 mg/dL    Alkaline Phosphatase 69 55 - 135 U/L    AST 20 10 - 40 U/L    ALT 13 10 - 44 U/L    Anion Gap 10 8 - 16 mmol/L    eGFR if African American 38 (A) >60 mL/min/1.73 m^2    eGFR if non African American 33 (A) >60 mL/min/1.73 m^2   Urinalysis   Result Value Ref Range    Specimen UA Urine, Clean Catch     Color, UA Yellow Yellow, Straw, Brittani    Appearance, UA Clear Clear    pH, UA 7.0 5.0 - 8.0    Specific Gravity, UA 1.015 1.005 - 1.030    Protein, UA Trace (A) Negative    Glucose, UA Negative Negative    Ketones, UA Negative Negative    Bilirubin (UA) Negative Negative    Occult Blood UA Trace (A) Negative    Nitrite, UA Negative Negative    Urobilinogen, UA Negative <2.0 EU/dL    Leukocytes, UA Negative Negative   Urinalysis   Result Value Ref Range    Specimen UA Urine, Clean Catch     Color, UA Yellow Yellow, Straw, Brittani    Appearance, UA Clear Clear    pH, UA 7.0 5.0 - 8.0    Specific Gravity, UA 1.015 1.005 - 1.030    Protein, UA Trace (A) Negative    Glucose, UA Negative Negative    Ketones, UA Negative Negative    Bilirubin (UA) Negative Negative    Occult Blood UA Trace (A) Negative    Nitrite, UA Negative Negative    Urobilinogen, UA Negative <2.0 EU/dL    Leukocytes, UA Negative Negative   Magnesium   Result Value Ref Range    Magnesium 2.0 1.6 - 2.6 mg/dL   Protime-INR   Result Value Ref Range    Prothrombin Time 10.2 9.0 - 12.5 sec    INR 1.0 0.8 - 1.2   Phosphorus   Result Value Ref Range    Phosphorus 3.2 2.7 - 4.5 mg/dL   APTT   Result Value Ref Range    aPTT 24.2 21.0 - 32.0 sec   POCT  glucose   Result Value Ref Range    POC Glucose 90 70 - 110 mg/dL   POCT glucose   Result Value Ref Range    POCT Glucose 90 70 - 110 mg/dL         Imaging Results:  Per [facility] radi[facility], pt's CT results   No CT evidence for acute intracranial abnormality. Cerebral atrophy with small vessel ischemic changes.          The Emergency Provider reviewed the vital signs and test results, which are outlined above.    ED Discussion     12:39 AM: Discussed case with Noé Dhaliwal ([facility]). Dr. Adamson agrees with current care and management of pt and accepts admission for observation. Recommends pulse dose of steroids (solu-medrol) BID.   Admitting Service: [facility] medicine   Admitting Physician: Dr. Adamson  Admit to: Tele-Obs    12:45 AM: Re-evaluated pt. I have discussed test results, shared treatment plan, and the need for admission with patient and family at bedside. Pt and family express understanding at this time and agree with all information. All questions answered. Pt and family have no further questions or concerns at this time. Pt is ready for admit.      ED Medication(s):  Medications   aspirin chewable tablet 81 mg (not administered)   atorvastatin tablet 20 mg (not administered)   clopidogrel tablet 75 mg (not administered)   enoxaparin injection 30 mg (not administered)   acetaminophen tablet 650 mg (not administered)   glucose chewable tablet 16 g (not administered)   glucose chewable tablet 24 g (not administered)   dextrose 50% injection 12.5 g (not administered)   dextrose 50% injection 25 g (not administered)   glucagon (human recombinant) injection 1 mg (not administered)   insulin aspart U-100 pen 1-10 Units (not administered)   methylPREDNISolone sodium succinate (SOLU-MEDROL) 500 mg in dextrose 5 % 100 mL IVPB ( Intravenous New Bag 4/7/18 0252)   amLODIPine tablet 10 mg (not administered)   benazepril tablet 20 mg (not administered)   furosemide tablet 20 mg (not administered)   sodium  chloride 0.9% flush 5 mL (not administered)   hydrocodone-acetaminophen 5-325mg per tablet 1 tablet (not administered)       New Prescriptions    No medications on file             Medical Decision Making    Medical Decision Making:   Clinical Tests:   Lab Tests: Ordered and Reviewed  Radiological Study: Ordered and Reviewed           Scribe Attestation:   Scribe #1: I performed the above scribed service and the documentation accurately describes the services I performed. I attest to the accuracy of the note.    Attending:   Physician Attestation Statement for Scribe #1: I, Jhony Toro MD, personally performed the services described in this documentation, as scribed by Sarahi Rosa, in my presence, and it is both accurate and complete.          Clinical Impression       ICD-10-CM ICD-9-CM   1. Headache R51 784.0       Disposition:   Disposition: Admitted  Condition: Fair         Jhony Toro MD  04/07/18 6028

## 2018-04-07 NOTE — CONSULTS
Consult Note  Neurology      Consult Requested By:   Noé Dhaliwal NP       Reason for Consult: suspected giant cell arteritis     SUBJECTIVE:     History of Present Illness:  Concepcion Wright is a 82 y.o. female who presented to neurology clinic yesterday complaining of an approximately three week history of severe left temporal headache and tenderness in that area of her scalp. Dr. Villarreal sent her to the ED for further evaluation for possible GCA.     She denies visual changes, weight loss, myalgias or arthralgias, or jaw claudication. She has a remote history of migraine but this head pain is distinct from prior migraines.     Past Medical History:   Diagnosis Date    Anemia     Coronary artery disease     Diabetes mellitus     Diabetes mellitus type I     Hyperlipidemia     Hypertension     PAD (peripheral artery disease)     Spinal stenosis         Past Surgical History:   Procedure Laterality Date    FEMORAL ARTERY STENT          Social History     Social History    Marital status:      Spouse name: N/A    Number of children: N/A    Years of education: N/A     Social History Main Topics    Smoking status: Former Smoker    Smokeless tobacco: Never Used    Alcohol use No    Drug use: No    Sexual activity: Not Asked     Other Topics Concern    None     Social History Narrative    None        FAMILY HISTORY:  Family History   Problem Relation Age of Onset    Hypertension Mother     Diabetes Mother     Hypertension Father     Diabetes Father     Hypertension Sister     Diabetes Sister     Hypertension Brother     Diabetes Brother     Hypertension Daughter     Diabetes Daughter         ALLERGIES:  Review of patient's allergies indicates:  No Known Allergies    MEDICATIONS:      Current Facility-Administered Medications:     acetaminophen tablet 650 mg, 650 mg, Oral, Q8H PRN, Jhony Toro MD    amLODIPine tablet 10 mg, 10 mg, Oral, Daily, Jhony Toro MD,  10 mg at 04/07/18 0909    aspirin chewable tablet 81 mg, 81 mg, Oral, Daily, Jhony Toro MD, 81 mg at 04/07/18 0908    atorvastatin tablet 20 mg, 20 mg, Oral, Daily, Jhony Toro MD, 20 mg at 04/07/18 0908    benazepril tablet 20 mg, 20 mg, Oral, Daily, Jhony Toro MD, 20 mg at 04/07/18 0909    clopidogrel tablet 75 mg, 75 mg, Oral, Daily, Jhony Toro MD, 75 mg at 04/07/18 0909    dextrose 50% injection 12.5 g, 12.5 g, Intravenous, PRN, Jhony Toro MD    dextrose 50% injection 25 g, 25 g, Intravenous, PRN, Jhony Toro MD    enoxaparin injection 30 mg, 30 mg, Subcutaneous, Daily, Jhony Toro MD    furosemide tablet 20 mg, 20 mg, Oral, Daily, Noé Dhaliwal NP, 20 mg at 04/07/18 0909    glucagon (human recombinant) injection 1 mg, 1 mg, Intramuscular, PRN, Jhony Toro MD    glucose chewable tablet 16 g, 16 g, Oral, PRN, Jhony Toro MD    glucose chewable tablet 24 g, 24 g, Oral, PRN, Jhony Toro MD    hydrocodone-acetaminophen 5-325mg per tablet 1 tablet, 1 tablet, Oral, Q4H PRN, Noé Dhaliwal NP    insulin aspart U-100 pen 1-10 Units, 1-10 Units, Subcutaneous, QID (AC + HS) PRN, Jhony Toro MD, 10 Units at 04/07/18 0910    methylPREDNISolone sodium succinate (SOLU-MEDROL) 500 mg in dextrose 5 % 100 mL IVPB, , Intravenous, Q12H, Noé Dhaliwal NP    sodium chloride 0.9% flush 5 mL, 5 mL, Intravenous, PRN, Noé Dhaliwal NP    Current Outpatient Prescriptions:     amlodipine-benazepril 10-20mg (LOTREL) 10-20 mg per capsule, Take 1 capsule by mouth once daily., Disp: 30 capsule, Rfl: 5    aspirin 81 MG Chew, Take 81 mg by mouth once daily., Disp: , Rfl:     atorvastatin (LIPITOR) 20 MG tablet, Take 1 tablet (20 mg total) by mouth once daily., Disp: 30 tablet, Rfl: 5    cholecalciferol, vitamin D3, 50,000 unit capsule, 1 CAP(S) BY MOUTH WEEKLY, Disp: 12  capsule, Rfl: 1    clopidogrel (PLAVIX) 75 mg tablet, Take 1 tablet (75 mg total) by mouth once daily., Disp: 30 tablet, Rfl: 5    cyclobenzaprine (FLEXERIL) 10 MG tablet, Take 10 mg by mouth 3 (three) times daily as needed for Muscle spasms., Disp: , Rfl:     ferrous gluconate 324 mg (37.5 mg iron) Tab, Take 1 tablet (324 mg total) by mouth once daily., Disp: 30 tablet, Rfl: 5    furosemide (LASIX) 20 MG tablet, TAKE 1 TABLET BY MOUTH DAILY, Disp: 30 tablet, Rfl: 0    hydrocodone-acetaminophen 7.5-325mg (NORCO) 7.5-325 mg per tablet, Take 1 tablet by mouth every 6 (six) hours as needed for Pain., Disp: , Rfl:     linagliptin (TRADJENTA) 5 mg Tab tablet, Take 1 tablet (5 mg total) by mouth once daily., Disp: 30 tablet, Rfl: 5    methylPREDNISolone (MEDROL DOSEPACK) 4 mg tablet, use as directed, Disp: 1 Package, Rfl: 0      OBJECTIVE:     VITAL SIGNS (Last 24H):  Temp:  [98.3 °F (36.8 °C)-99 °F (37.2 °C)]   Pulse:  [74-98]   Resp:  [12-19]   BP: (101-154)/(60-67)   SpO2:  [96 %-100 %]     Review of Systems:  Constitutional: Negative for fever and weight loss.   HENT: Negative for hearing loss.    Eyes: Negative for blurred vision, double vision, photophobia and pain.   Respiratory: Negative for cough.    Cardiovascular: Negative for chest pain.   Gastrointestinal: Negative for abdominal pain, nausea and vomiting.   Genitourinary: Negative for dysuria, frequency and urgency.   Musculoskeletal: Negative.  Negative for back pain, falls, joint pain, myalgias and neck pain.   Skin: Negative for itching and rash.   Neurological: Positive for headaches. Negative for tingling.   Psychiatric/Behavioral: Negative for depression and memory loss.    Physical Exam:  Constitutional: elderly female  Head: +left temporal tenderness to light palpation  Vascular: 2+ right temporal artery pulse, unable to appreciate left temporal pulse   Mouth: Mucous membranes moist.  Neck: Supple  Pulmonary/Chest: Effort normal. No respiratory  distress.   Abdomen: Soft. Non-tender and non-distended.    Neurological: mental status - alert, fluent speech, following commands; CNs - II-XII WNL; motor - power 5/5, no pronator drift; sensation - intact to LT; coordination - FNF WNL; DTRs - symmetrical with downgoing toes b/l   Skin: Warm and dry. No lesions.  Extremities: No clubbing, cyanosis or edema.    Laboratory:  CBC:   Recent Labs  Lab 04/07/18  0553   WBC 7.33   RBC 3.62*   HGB 11.6*   HCT 36.2*        CMP:   Recent Labs  Lab 04/07/18  0553   *   CALCIUM 9.3   ALBUMIN 3.4*   PROT 8.1      K 3.8   CO2 25      BUN 19   CREATININE 1.4   ALKPHOS 68   ALT 13   AST 23   BILITOT 0.6     ESR 52    Diagnostic Results:      BILATERAL CAROTID DUPLEX ULTRASOUND.    Comparison: None    History:  Giant cell arteritis    FINDINGS:     Right common carotid:   Peak systolic velocity 89 cm/sec.    Right internal carotid artery: Moderate plaque is seen in the bulb    Peak systolic velocity: 83 cm/sec.   IC/CC ratio:  0.9.    Left common carotid:   Peak systolic velocity 108 cm/sec.    Left internal carotid artery: Moderate plaque is seen in the bulb    Peak systolic velocity 84  cm/sec.    IC/CC ratio 0.78.    Right vertebral artery demonstrates antegrade flow.    Bidirectional flow is seen within the left vertebral artery which is not entirely specific it can be seen with subclavian steal.   Impression           No significant stenosis within the internal carotid arteries.      Severe external carotid artery stenosis is noted.    Bidirectional flow is seen within the left vertebral artery which is not entirely specific it can be seen with subclavian steal. Differential includes hypoplastic vertebral artery, proximal vertebral artery stenosis or occlusion. CTA head and neck could be obtained for further characterization.             ASSESSMENT/PLAN:     Probable giant cell arteritis:     - given high suspicion, will start on prednisone 60 mg  daily  - careful blood sugar monitoring and management given diabetes  - check CRP  - given CDU results, will check CTA head/neck  - if CTA negative, can be discharged but needs left temporal artery biopsy scheduled with vascular surgery  - f/u with neurology in 2 weeks to assess response and manage down-titration of prednisone

## 2018-04-07 NOTE — SUBJECTIVE & OBJECTIVE
Past Medical History:   Diagnosis Date    Anemia     Coronary artery disease     Diabetes mellitus     Diabetes mellitus type I     Hyperlipidemia     Hypertension     PAD (peripheral artery disease)     Spinal stenosis        Past Surgical History:   Procedure Laterality Date    FEMORAL ARTERY STENT         Review of patient's allergies indicates:  No Known Allergies    No current facility-administered medications on file prior to encounter.      Current Outpatient Prescriptions on File Prior to Encounter   Medication Sig    amlodipine-benazepril 10-20mg (LOTREL) 10-20 mg per capsule Take 1 capsule by mouth once daily.    aspirin 81 MG Chew Take 81 mg by mouth once daily.    atorvastatin (LIPITOR) 20 MG tablet Take 1 tablet (20 mg total) by mouth once daily.    cholecalciferol, vitamin D3, 50,000 unit capsule 1 CAP(S) BY MOUTH WEEKLY    clopidogrel (PLAVIX) 75 mg tablet Take 1 tablet (75 mg total) by mouth once daily.    cyclobenzaprine (FLEXERIL) 10 MG tablet Take 10 mg by mouth 3 (three) times daily as needed for Muscle spasms.    ferrous gluconate 324 mg (37.5 mg iron) Tab Take 1 tablet (324 mg total) by mouth once daily.    furosemide (LASIX) 20 MG tablet TAKE 1 TABLET BY MOUTH DAILY    hydrocodone-acetaminophen 7.5-325mg (NORCO) 7.5-325 mg per tablet Take 1 tablet by mouth every 6 (six) hours as needed for Pain.    linagliptin (TRADJENTA) 5 mg Tab tablet Take 1 tablet (5 mg total) by mouth once daily.    methylPREDNISolone (MEDROL DOSEPACK) 4 mg tablet use as directed     Family History     Problem Relation (Age of Onset)    Diabetes Mother, Father, Sister, Brother, Daughter    Hypertension Mother, Father, Sister, Brother, Daughter        Social History Main Topics    Smoking status: Former Smoker    Smokeless tobacco: Never Used    Alcohol use No    Drug use: No    Sexual activity: Not on file     Review of Systems   Constitutional: Negative for chills, diaphoresis, fatigue and fever.    HENT: Negative for congestion, sore throat and voice change.    Eyes: Negative for photophobia, pain, redness and visual disturbance.        Positive left eye tearing.    Respiratory: Negative for cough, shortness of breath, wheezing and stridor.    Cardiovascular: Negative for chest pain and leg swelling.   Gastrointestinal: Negative for abdominal distention, abdominal pain, constipation, diarrhea, nausea and vomiting.   Endocrine: Negative for polydipsia, polyphagia and polyuria.   Genitourinary: Negative for difficulty urinating, dysuria, flank pain, pelvic pain, urgency and vaginal discharge.   Musculoskeletal: Negative for back pain, joint swelling, neck pain and neck stiffness.   Skin: Negative for color change and rash.   Allergic/Immunologic: Negative for immunocompromised state.   Neurological: Positive for headaches. Negative for dizziness, syncope, weakness and numbness.   Hematological: Does not bruise/bleed easily.   Psychiatric/Behavioral: Negative for agitation, behavioral problems and confusion.     Objective:     Vital Signs (Most Recent):  Temp: 99 °F (37.2 °C) (04/06/18 2053)  Pulse: 86 (04/07/18 0248)  Resp: 19 (04/07/18 0248)  BP: 134/63 (04/07/18 0248)  SpO2: 98 % (04/07/18 0248) Vital Signs (24h Range):  Temp:  [99 °F (37.2 °C)] 99 °F (37.2 °C)  Pulse:  [74-89] 86  Resp:  [12-20] 19  SpO2:  [96 %-98 %] 98 %  BP: (101-154)/(60-67) 134/63     Weight: 66.9 kg (147 lb 7.8 oz)  Body mass index is 26.13 kg/m².    Physical Exam   Constitutional: She is oriented to person, place, and time. She appears well-developed. No distress.   Elderly    HENT:   Head: Normocephalic and atraumatic.   Nose: Nose normal.   Eyes: Conjunctivae and EOM are normal. Pupils are equal, round, and reactive to light. No scleral icterus.   Neck: Normal range of motion. Neck supple. No tracheal deviation present.   Cardiovascular: Normal rate, regular rhythm, normal heart sounds and intact distal pulses.    No murmur  heard.  Pulmonary/Chest: Effort normal and breath sounds normal. No stridor. No respiratory distress. She has no wheezes. She has no rales.   Abdominal: Soft. Bowel sounds are normal. She exhibits no distension. There is no tenderness. There is no guarding.   Genitourinary:   Genitourinary Comments: Deferred no complaint   Musculoskeletal: Normal range of motion. She exhibits no edema or deformity.   Neurological: She is alert and oriented to person, place, and time. No cranial nerve deficit or sensory deficit. Coordination normal.   Locates pain to left temporal    Skin: Skin is warm and dry. Capillary refill takes less than 2 seconds. No rash noted. She is not diaphoretic.   Psychiatric: She has a normal mood and affect. Her behavior is normal. Judgment and thought content normal.   Nursing note and vitals reviewed.        CRANIAL NERVES     CN III, IV, VI   Pupils are equal, round, and reactive to light.  Extraocular motions are normal.        Significant Labs: All pertinent labs within the past 24 hours have been reviewed.  Results for orders placed or performed during the hospital encounter of 04/06/18   CBC auto differential   Result Value Ref Range    WBC 6.32 3.90 - 12.70 K/uL    RBC 3.51 (L) 4.00 - 5.40 M/uL    Hemoglobin 11.0 (L) 12.0 - 16.0 g/dL    Hematocrit 35.1 (L) 37.0 - 48.5 %     (H) 82 - 98 fL    MCH 31.3 (H) 27.0 - 31.0 pg    MCHC 31.3 (L) 32.0 - 36.0 g/dL    RDW 14.9 (H) 11.5 - 14.5 %    Platelets 321 150 - 350 K/uL    MPV 10.0 9.2 - 12.9 fL    Gran # (ANC) 3.6 1.8 - 7.7 K/uL    Lymph # 1.8 1.0 - 4.8 K/uL    Mono # 0.7 0.3 - 1.0 K/uL    Eos # 0.2 0.0 - 0.5 K/uL    Baso # 0.04 0.00 - 0.20 K/uL    Gran% 56.3 38.0 - 73.0 %    Lymph% 29.0 18.0 - 48.0 %    Mono% 11.1 4.0 - 15.0 %    Eosinophil% 3.0 0.0 - 8.0 %    Basophil% 0.6 0.0 - 1.9 %    Differential Method Automated    Comprehensive metabolic panel   Result Value Ref Range    Sodium 139 136 - 145 mmol/L    Potassium 3.8 3.5 - 5.1 mmol/L     Chloride 103 95 - 110 mmol/L    CO2 26 23 - 29 mmol/L    Glucose 110 70 - 110 mg/dL    BUN, Bld 20 8 - 23 mg/dL    Creatinine 1.5 (H) 0.5 - 1.4 mg/dL    Calcium 9.6 8.7 - 10.5 mg/dL    Total Protein 8.5 (H) 6.0 - 8.4 g/dL    Albumin 3.6 3.5 - 5.2 g/dL    Total Bilirubin 0.5 0.1 - 1.0 mg/dL    Alkaline Phosphatase 69 55 - 135 U/L    AST 20 10 - 40 U/L    ALT 13 10 - 44 U/L    Anion Gap 10 8 - 16 mmol/L    eGFR if African American 38 (A) >60 mL/min/1.73 m^2    eGFR if non African American 33 (A) >60 mL/min/1.73 m^2   Urinalysis   Result Value Ref Range    Specimen UA Urine, Clean Catch     Color, UA Yellow Yellow, Straw, Brittani    Appearance, UA Clear Clear    pH, UA 7.0 5.0 - 8.0    Specific Gravity, UA 1.015 1.005 - 1.030    Protein, UA Trace (A) Negative    Glucose, UA Negative Negative    Ketones, UA Negative Negative    Bilirubin (UA) Negative Negative    Occult Blood UA Trace (A) Negative    Nitrite, UA Negative Negative    Urobilinogen, UA Negative <2.0 EU/dL    Leukocytes, UA Negative Negative   Urinalysis   Result Value Ref Range    Specimen UA Urine, Clean Catch     Color, UA Yellow Yellow, Straw, Brittani    Appearance, UA Clear Clear    pH, UA 7.0 5.0 - 8.0    Specific Gravity, UA 1.015 1.005 - 1.030    Protein, UA Trace (A) Negative    Glucose, UA Negative Negative    Ketones, UA Negative Negative    Bilirubin (UA) Negative Negative    Occult Blood UA Trace (A) Negative    Nitrite, UA Negative Negative    Urobilinogen, UA Negative <2.0 EU/dL    Leukocytes, UA Negative Negative   Magnesium   Result Value Ref Range    Magnesium 2.0 1.6 - 2.6 mg/dL   Protime-INR   Result Value Ref Range    Prothrombin Time 10.2 9.0 - 12.5 sec    INR 1.0 0.8 - 1.2   Phosphorus   Result Value Ref Range    Phosphorus 3.2 2.7 - 4.5 mg/dL   APTT   Result Value Ref Range    aPTT 24.2 21.0 - 32.0 sec   POCT glucose   Result Value Ref Range    POC Glucose 90 70 - 110 mg/dL   POCT glucose   Result Value Ref Range    POCT Glucose 90 70 -  110 mg/dL     Component      Latest Ref Rng & Units 4/6/2018 3/5/2018   Sed Rate      0 - 20 mm/Hr 52 (H) 20     Significant Imaging: I have reviewed all pertinent imaging results/findings within the past 24 hours.   Imaging Results          CT Head Without Contrast    Per ER note, Per virtual radiology No CT evidence for acute intracranial abnormality. Cerebral atrophy with small vessel ischemic changes.

## 2018-04-07 NOTE — PROGRESS NOTES
Concepcion Wright is a 82 year old female admitted for Suspected temporal arteritis. Pt seen and examined. Pt currently c/o left temporal headache, reports 5/10 on pain scale. Pt reports pain has improved. Pt denies dizziness, blurred vision, photophobia. Neurology consulted -- probable giant cell arteritis. Recommended starting prednisone 60 mg PO daily and obtaining CTA head/neck. CTA head/neck findings consistent with left-sided subclavian steal syndrome; the proximalmost aspect of the left subclavian artery is likely completely occluded with calcific plaque; there is 60-70% stenosis of the origin of the left common carotid artery from the arch; 60-70% stenosis of the proximal aspect of the right subclavian artery; the right internal carotid artery demonstrates focal stenosis of 50-60%; and the proximal aspect of the left internal carotid artery is stenotic to 60-70%. Notified Dr. Blank (neurology) of CTA head/neck results, will consult Vascular surgery -- awaiting input.

## 2018-04-07 NOTE — ED NOTES
Pt. Noted lying in bed resting to comfort AAOx3 on continuous monitor, respirations even and unlabored, BBS CTA. Speech clear, no facial droop noted. Pt. Noted with drooped left lower lid to eye. Pt. Noted with equal bilaterally upper and lower extremity strengths. Pt. Stands with assistance and ambulates to bermudez restroom unassisted. Pt. Denies HA at this time and denies any acute concerns at this time. Pt. Calm and cooperative, provided update to care. Plan of care continued.

## 2018-04-07 NOTE — ED NOTES
Pt. Ambulates to bermudez restroom, unassisted,steady gait noted. Returns to room, placed back onto continuous monitor. Respirations even and unlabored, no neuro deficits noted, AAOx3, skin warm, dry, intact, NS stable at this time. Plan of care continued.

## 2018-04-08 VITALS
HEIGHT: 63 IN | OXYGEN SATURATION: 96 % | WEIGHT: 147.69 LBS | SYSTOLIC BLOOD PRESSURE: 118 MMHG | DIASTOLIC BLOOD PRESSURE: 65 MMHG | BODY MASS INDEX: 26.17 KG/M2 | HEART RATE: 84 BPM | TEMPERATURE: 98 F | RESPIRATION RATE: 16 BRPM

## 2018-04-08 LAB
ALBUMIN SERPL BCP-MCNC: 3.4 G/DL
ALP SERPL-CCNC: 63 U/L
ALT SERPL W/O P-5'-P-CCNC: 15 U/L
ANION GAP SERPL CALC-SCNC: 11 MMOL/L
AST SERPL-CCNC: 23 U/L
BASOPHILS # BLD AUTO: 0.01 K/UL
BASOPHILS NFR BLD: 0.1 %
BILIRUB SERPL-MCNC: 0.3 MG/DL
BUN SERPL-MCNC: 25 MG/DL
CALCIUM SERPL-MCNC: 9.6 MG/DL
CHLORIDE SERPL-SCNC: 105 MMOL/L
CO2 SERPL-SCNC: 23 MMOL/L
CREAT SERPL-MCNC: 1.4 MG/DL
DIFFERENTIAL METHOD: ABNORMAL
EOSINOPHIL # BLD AUTO: 0 K/UL
EOSINOPHIL NFR BLD: 0 %
ERYTHROCYTE [DISTWIDTH] IN BLOOD BY AUTOMATED COUNT: 15 %
EST. GFR  (AFRICAN AMERICAN): 41 ML/MIN/1.73 M^2
EST. GFR  (NON AFRICAN AMERICAN): 36 ML/MIN/1.73 M^2
GLUCOSE SERPL-MCNC: 129 MG/DL
HCT VFR BLD AUTO: 34 %
HGB BLD-MCNC: 11 G/DL
LYMPHOCYTES # BLD AUTO: 0.9 K/UL
LYMPHOCYTES NFR BLD: 8.7 %
MCH RBC QN AUTO: 32.3 PG
MCHC RBC AUTO-ENTMCNC: 32.4 G/DL
MCV RBC AUTO: 100 FL
MONOCYTES # BLD AUTO: 0.5 K/UL
MONOCYTES NFR BLD: 4.7 %
NEUTROPHILS # BLD AUTO: 8.6 K/UL
NEUTROPHILS NFR BLD: 86.5 %
PLATELET # BLD AUTO: 318 K/UL
PMV BLD AUTO: 10.4 FL
POCT GLUCOSE: 99 MG/DL (ref 70–110)
POTASSIUM SERPL-SCNC: 4.1 MMOL/L
PROT SERPL-MCNC: 7.9 G/DL
RBC # BLD AUTO: 3.41 M/UL
SODIUM SERPL-SCNC: 139 MMOL/L
WBC # BLD AUTO: 9.99 K/UL

## 2018-04-08 PROCEDURE — 25000003 PHARM REV CODE 250: Performed by: NURSE PRACTITIONER

## 2018-04-08 PROCEDURE — G0378 HOSPITAL OBSERVATION PER HR: HCPCS

## 2018-04-08 PROCEDURE — 63600175 PHARM REV CODE 636 W HCPCS: Performed by: PSYCHIATRY & NEUROLOGY

## 2018-04-08 PROCEDURE — 80053 COMPREHEN METABOLIC PANEL: CPT

## 2018-04-08 PROCEDURE — 96365 THER/PROPH/DIAG IV INF INIT: CPT

## 2018-04-08 PROCEDURE — 25000003 PHARM REV CODE 250: Performed by: EMERGENCY MEDICINE

## 2018-04-08 PROCEDURE — 85025 COMPLETE CBC W/AUTO DIFF WBC: CPT

## 2018-04-08 PROCEDURE — 36415 COLL VENOUS BLD VENIPUNCTURE: CPT

## 2018-04-08 RX ORDER — PREDNISONE 20 MG/1
60 TABLET ORAL DAILY
Qty: 42 TABLET | Refills: 0 | Status: SHIPPED | OUTPATIENT
Start: 2018-04-09 | End: 2018-04-16 | Stop reason: SDUPTHER

## 2018-04-08 RX ORDER — ATORVASTATIN CALCIUM 80 MG/1
80 TABLET, FILM COATED ORAL DAILY
Qty: 30 TABLET | Refills: 1 | Status: SHIPPED | OUTPATIENT
Start: 2018-04-09 | End: 2018-06-28

## 2018-04-08 RX ADMIN — ASPIRIN 81 MG CHEWABLE TABLET 81 MG: 81 TABLET CHEWABLE at 09:04

## 2018-04-08 RX ADMIN — AMLODIPINE BESYLATE 10 MG: 10 TABLET ORAL at 09:04

## 2018-04-08 RX ADMIN — PREDNISONE 60 MG: 20 TABLET ORAL at 09:04

## 2018-04-08 RX ADMIN — ATORVASTATIN CALCIUM 80 MG: 40 TABLET, FILM COATED ORAL at 09:04

## 2018-04-08 RX ADMIN — CLOPIDOGREL BISULFATE 75 MG: 75 TABLET ORAL at 09:04

## 2018-04-08 RX ADMIN — FUROSEMIDE 20 MG: 20 TABLET ORAL at 09:04

## 2018-04-08 RX ADMIN — HYDROCODONE BITARTRATE AND ACETAMINOPHEN 1 TABLET: 5; 325 TABLET ORAL at 09:04

## 2018-04-08 RX ADMIN — BENAZEPRIL HYDROCHLORIDE 20 MG: 20 TABLET, FILM COATED ORAL at 09:04

## 2018-04-08 NOTE — NURSING
Patient discharged from observation after treatment for temporal arteritis. Reviewed discharge instructions and medications. Expressed the need for follow up appointments per physicians recommendations. Patient was given the opportunity for questions and all were answered to their satisfaction. Patient discharged in no acute distress.   IV removed from Rt forearm without incident. Compression dressing applied and patient instructed to leave on no longer than 30 minutes.

## 2018-04-08 NOTE — PROGRESS NOTES
Progress Note  Neurology      SUBJECTIVE:     Left temporal headache is much improved today.     Past Medical History:   Diagnosis Date    Anemia     Coronary artery disease     Diabetes mellitus     Diabetes mellitus type I     Hyperlipidemia     Hypertension     PAD (peripheral artery disease)     Spinal stenosis         Past Surgical History:   Procedure Laterality Date    FEMORAL ARTERY STENT          Social History     Social History    Marital status:      Spouse name: N/A    Number of children: N/A    Years of education: N/A     Social History Main Topics    Smoking status: Former Smoker    Smokeless tobacco: Never Used    Alcohol use No    Drug use: No    Sexual activity: Not Asked     Other Topics Concern    None     Social History Narrative    None        FAMILY HISTORY:  Family History   Problem Relation Age of Onset    Hypertension Mother     Diabetes Mother     Hypertension Father     Diabetes Father     Hypertension Sister     Diabetes Sister     Hypertension Brother     Diabetes Brother     Hypertension Daughter     Diabetes Daughter         ALLERGIES:  Review of patient's allergies indicates:  No Known Allergies    MEDICATIONS:      Current Facility-Administered Medications:     acetaminophen tablet 650 mg, 650 mg, Oral, Q8H PRN, Jhony Toro MD    amLODIPine tablet 10 mg, 10 mg, Oral, Daily, Jhony Toro MD, 10 mg at 04/08/18 0917    aspirin chewable tablet 81 mg, 81 mg, Oral, Daily, Jhony Toro MD, 81 mg at 04/08/18 0916    atorvastatin tablet 80 mg, 80 mg, Oral, Daily, GRIS Carrillo, 80 mg at 04/08/18 0917    benazepril tablet 20 mg, 20 mg, Oral, Daily, Jhony Toro MD, 20 mg at 04/08/18 0917    clopidogrel tablet 75 mg, 75 mg, Oral, Daily, Jhony Toro MD, 75 mg at 04/08/18 0917    dextrose 50% injection 12.5 g, 12.5 g, Intravenous, PRN, Jhony Toro MD    dextrose 50%  injection 25 g, 25 g, Intravenous, PRN, Jhony Toro MD    enoxaparin injection 30 mg, 30 mg, Subcutaneous, Daily, Jhony Toro MD, 30 mg at 04/07/18 1807    furosemide tablet 20 mg, 20 mg, Oral, Daily, Noé Dhaliwal NP, 20 mg at 04/08/18 0917    glucagon (human recombinant) injection 1 mg, 1 mg, Intramuscular, PRN, Jhony Toro MD    glucose chewable tablet 16 g, 16 g, Oral, PRN, Jhony Toro MD    glucose chewable tablet 24 g, 24 g, Oral, PRN, Jhony Toro MD    hydrocodone-acetaminophen 5-325mg per tablet 1 tablet, 1 tablet, Oral, Q4H PRN, Noé Dhaliwal NP, 1 tablet at 04/08/18 0917    insulin aspart U-100 pen 1-10 Units, 1-10 Units, Subcutaneous, QID (AC + HS) PRN, Jhony Toro MD, 1 Units at 04/07/18 2119    predniSONE tablet 60 mg, 60 mg, Oral, Daily, Enrique Blank MD, 60 mg at 04/08/18 0917    sodium chloride 0.9% flush 5 mL, 5 mL, Intravenous, PRN, Noé Dhaliwal NP      OBJECTIVE:     VITAL SIGNS (Last 24H):  Temp:  [96.6 °F (35.9 °C)-98 °F (36.7 °C)]   Pulse:  [82-94]   Resp:  [17-20]   BP: (118-150)/(63-78)   SpO2:  [96 %-100 %]       Physical Exam:      Laboratory:    LDL 87.8  BS   CRP 7.6      Diagnostic Results:    EXAM: [id number] HEAD AND NECK (XPD)    CLINICAL HISTORY: evaluate for subclavian steal     TECHNIQUE: Standard CTA of the intracranial and extracranial circulation was performed after the administration of intravenous contrast in the arterial phase. This examination was interpreted using multiplanar and 3D reconstructions.    COMPARISON: None    FINDINGS:    Extracranial:    There is extensive calcific atheromatous change of the aortic arch and involving the proximal aspect of the vessels originating from the arch.  There is extensive calcific plaque involving the origin of the left common carotid artery with 60-70% stenosis.  There is very dense and calcification of the origin of the left  subclavian artery which is likely completely occluded.  The left vertebral artery originates from the left subclavian artery just beyond this region as may be seen with subclavian steal syndrome. There is also extensive calcific plaque involving the proximal aspect of the right subclavian artery with moderate irregular luminal narrowing on the order of 60-70%.  The right vertebral artery originates beyond the region of stenosis.    The right common carotid demonstrate scattered calcific plaque without significant stenosis.  There is calcific plaque involving the carotid bulb and proximal aspect of the right internal carotid artery with focal stenosis of 50-60%.  This is best demonstrated on axial series 2 image 126.  The more cranial, ascending cervical right internal carotid artery is of a more normal course and caliber.    The left common carotid demonstrate scattered foci of calcific atheromatous plaque with mild irregular luminal narrowing as it ascends within the neck.  There is also extensive calcific plaque within the left carotid bulb which narrows the left internal carotid artery to 60-70%.  This is best demonstrated on axial series 2 image 129.    Extracranial vertebral arteries: The left vertebral artery is dominant. There is coarse calcific plaque involving the origin of the left vertebral artery with moderate narrowing of its lumen.  No definite stenosis of the origin of the right vertebral artery.  The more cranial, ascending cervical portions of the vertebral arteries are of a more normal course and caliber.    Intracranial:     The right intracranial internal carotid artery demonstrates calcific atheromatous plaque within the cavernous segment with moderate irregular luminal narrowing. The left intracranial internal carotid artery also demonstrates moderate irregular luminal narrowing relating to calcific plaque within the cavernous segment. The right middle cerebral artery is normal. The left middle  cerebral artery is normal. The right anterior cerebral artery is normal. The left anterior cerebral artery is normal. Anterior communicating artery is normal. The right posterior cerebral artery is normal. The left posterior cerebral artery is normal. There is a patent left-sided posterior communicating artery.  No definite right-sided posterior communicating artery. No evidence of aneurysm.     The basilar artery is normal. There is calcific plaque involving the V4 segments of the vertebral arteries, left more extensive than right, with mild luminal narrowing.  The right vertebral artery likely terminates in PICA. The superior cerebellar arteries are normal. The anterior inferior cerebellar arteries are normal. The posterior inferior cerebellar arteries are normal.     There are degenerative spine changes.   Impression          1.  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  The proximalmost aspect of the left subclavian artery is likely completely occluded with calcific plaque.  Ultrasound evaluation may be confirmatory.    2.  The right vertebral artery terminates in PICA, a variant of normal.    3.  There is 60-70% stenosis of the origin of the left common carotid artery from the arch.    4.  There is also 60-70% stenosis of the proximal aspect of the right subclavian artery.    5.  The right internal carotid artery demonstrates focal stenosis of 50-60%.  The proximal aspect of the left internal carotid artery is stenotic to 60-70%.  This relates to calcific plaque.    6.  Moderate, focal stenosis of the origin of the left vertebral artery at its origin from the left subclavian artery.    All CT scans at this facility use dose modulation, iterative reconstruction, and/or weight base dosing when appropriate to reduce radiation dose to as low as reasonably achievable.       Electronically signed by: BREEZY CM M.D.  Date: 04/07/18  Time: 12:29          Ultrasound Doppler temporal  arteries    Indication: Left-sided headaches.  Clinical concern for left-sided temporal arteritis.    Findings: The right temporal artery has a normal appearance with a normal wall thickness.  In comparison the left temporal artery shows a hypoechoic halo sign with narrowing of the lumen and thickening of the wall especially on color Doppler assessment.  There is no occlusion of the left temporal artery and the peak systolic velocity measurement of the left temporal artery is 30 cm/s.  Incidentally the peak systolic velocity measurement of the right temporal artery is 42 cm/s.   Impression      There is clear asymmetric narrowing of the vessel lumen of the left temporal artery compared to the normal appearing right temporal artery and a hypoechoic halo sign in the left temporal artery, all characteristic of left temporal arteritis.             ASSESSMENT/PLAN:     Temporal arteritis; vasculopathy and left-sided subclavian steal syndrome.     - continue ASA and Plavix  - statin increased to maximum dose  - OK for discharge from neurology standpoint with follow up with vascular surgery and neurology

## 2018-04-08 NOTE — CONSULTS
[facility] -   Vascular Surgery  Consult Note    Consults  Subjective:     Chief Complaint/Reason for Admission: Giant cell arteritis    History of Present Illness:  82 y.o. female who was seen at neurology clinic yesterday complaining of an approximately three week history of severe left temporal headache and tenderness in that area of her scalp. Dr. Villarreal sent her to the ED for further evaluation for possible GCA.      She denies visual changes, weight loss, myalgias or arthralgias, or jaw claudication. She has a remote history of migraine but this head pain is distinct from prior migraines.    Pt admitted for observation.  Started on steroids    Currently feels much better.  Denies Headache/vision changes  Denies recent stroke/TIA symptoms    Prescriptions Prior to Admission   Medication Sig Dispense Refill Last Dose    amlodipine-benazepril 10-20mg (LOTREL) 10-20 mg per capsule Take 1 capsule by mouth once daily. 30 capsule 5 Taking    aspirin 81 MG Chew Take 81 mg by mouth once daily.   Taking    atorvastatin (LIPITOR) 20 MG tablet Take 1 tablet (20 mg total) by mouth once daily. 30 tablet 5 Taking    cholecalciferol, vitamin D3, 50,000 unit capsule 1 CAP(S) BY MOUTH WEEKLY 12 capsule 1 Taking    clopidogrel (PLAVIX) 75 mg tablet Take 1 tablet (75 mg total) by mouth once daily. 30 tablet 5 Taking    cyclobenzaprine (FLEXERIL) 10 MG tablet Take 10 mg by mouth 3 (three) times daily as needed for Muscle spasms.   Taking    ferrous gluconate 324 mg (37.5 mg iron) Tab Take 1 tablet (324 mg total) by mouth once daily. 30 tablet 5 Taking    furosemide (LASIX) 20 MG tablet TAKE 1 TABLET BY MOUTH DAILY 30 tablet 0 Taking    hydrocodone-acetaminophen 7.5-325mg (NORCO) 7.5-325 mg per tablet Take 1 tablet by mouth every 6 (six) hours as needed for Pain.   Taking    linagliptin (TRADJENTA) 5 mg Tab tablet Take 1 tablet (5 mg total) by mouth once daily. 30 tablet 5 Taking    methylPREDNISolone (MEDROL  DOSEPACK) 4 mg tablet use as directed 1 Package 0        Review of patient's allergies indicates:  No Known Allergies    Past Medical History:   Diagnosis Date    Anemia     Coronary artery disease     Diabetes mellitus     Diabetes mellitus type I     Hyperlipidemia     Hypertension     PAD (peripheral artery disease)     Spinal stenosis      Past Surgical History:   Procedure Laterality Date    FEMORAL ARTERY STENT       Family History     Problem Relation (Age of Onset)    Diabetes Mother, Father, Sister, Brother, Daughter    Hypertension Mother, Father, Sister, Brother, Daughter        Social History Main Topics    Smoking status: Former Smoker    Smokeless tobacco: Never Used    Alcohol use No    Drug use: No    Sexual activity: Not on file     Review of Systems   Negative except for above    Objective:     Vital Signs (Most Recent):  Temp: 97.7 °F (36.5 °C) (04/08/18 0715)  Pulse: 87 (04/08/18 0715)  Resp: 18 (04/08/18 0715)  BP: 131/78 (04/08/18 0715)  SpO2: 99 % (04/08/18 0715) Vital Signs (24h Range):  Temp:  [96.6 °F (35.9 °C)-98 °F (36.7 °C)] 97.7 °F (36.5 °C)  Pulse:  [82-94] 87  Resp:  [17-20] 18  SpO2:  [96 %-100 %] 99 %  BP: (118-150)/(63-78) 131/78     Weight: 67 kg (147 lb 11.3 oz)  Body mass index is 26.17 kg/m².         Physical Exam  Constitutional: elderly female  Head: +left temporal tenderness to light palpation  Vascular: 2+ right temporal artery pulse, unable to appreciate left temporal pulse   Mouth: Mucous membranes moist.  Neck: Supple  Pulmonary/Chest: Effort normal. No respiratory distress.   Abdomen: Soft. Non-tender and non-distended.    Neurological: mental status - alert, fluent speech, following commands; CNs - II-XII WNL; motor - power 5/5, no pronator drift; sensation - intact to LT; coordination - FNF WNL; DTRs - symmetrical with downgoing toes b/l   Skin: Warm and dry. No lesions.  Extremities: No clubbing, cyanosis or edema    Significant Labs:  BMP:   Recent  Labs  Lab 04/06/18  2347  04/08/18  0500     < > 129*     < > 139   K 3.8  < > 4.1     < > 105   CO2 26  < > 23   BUN 20  < > 25*   CREATININE 1.5*  < > 1.4   CALCIUM 9.6  < > 9.6   MG 2.0  --   --    < > = values in this interval not displayed.  CBC:   Recent Labs  Lab 04/08/18  0500   WBC 9.99   RBC 3.41*   HGB 11.0*   HCT 34.0*      *   MCH 32.3*   MCHC 32.4     Coagulation:   Recent Labs  Lab 04/06/18  2345   LABPROT 10.2   INR 1.0   APTT 24.2       Significant Diagnostics:  I have reviewed all pertinent imaging results/findings within the past 24 hours.   CTA head/neck. CTA head/neck findings consistent with left-sided subclavian steal syndrome; the proximalmost aspect of the left subclavian artery is likely completely occluded with calcific plaque; there is 60-70% stenosis of the origin of the left common carotid artery from the arch; 60-70% stenosis of the proximal aspect of the right subclavian artery; the right internal carotid artery demonstrates focal stenosis of 50-60%; and the proximal aspect of the left internal carotid artery is stenotic to 60-70%      Assessment/Plan:     Active Diagnoses:    Diagnosis Date Noted POA    PRINCIPAL PROBLEM:  Temporal arteritis, suspected [M31.6] 04/07/2018 Yes    Headache [R51] 04/07/2018 Yes    Diabetes mellitus without complication [E11.9] 03/06/2018 Yes    Essential hypertension, benign [I10] 03/06/2018 Yes    Chronic kidney disease (CKD), stage III (moderate) [N18.3] 10/18/2017 Yes      Problems Resolved During this Admission:    Diagnosis Date Noted Date Resolved POA   Pt currently feeling better on steroids  Ok for d/c today from vascular surgery  Will schedule out patient left temporal artery biopsy this week  Recommend continued ASA/Plavix/Statin for asymptomatic left carotid stenosis    Thank you for your consult. I will sign off. Please contact us if you have any additional questions.    Estrada Adams IV, MD  Vascular  Surgery  [facility]

## 2018-04-10 NOTE — HOSPITAL COURSE
Concepcion Wright is a 82 year old female admitted for suspected temporal arteritis. CT head with no acute findings. Neurology consulted and recommended obtaining CTA head/neck. CTA head/neck with left-sided subclavian steal syndrome; the proximalmost aspect of the left subclavian artery is likely completely occluded with calcific plaque; there is 60-70% stenosis of the origin of the left common carotid artery from the arch; 60-70% stenosis of the proximal aspect of the right subclavian artery; the right internal carotid artery demonstrates focal stenosis of 50-60%; and the proximal aspect of the left internal carotid artery is stenotic to 60-70%. Due to findings of CTA head/neck, Vascular surgery consulted and recommending continuing ASA/Plavix/Statin for asymptomatic left carotid stenosis. US Doppler middle cerebral artery showed a clear asymmetric narrowing of the vessel lumen of the left temporal artery compared to the normal appearing right temporal artery and a hypoechoic halo sign in the left temporal artery, all characteristic of left temporal arteritis. Neurology recommended to start Prednisone 60 mg PO daily and schedule left temporal artery biopsy with vascular surgery. Headache has improved with steroids. Pt will follow up with Dr. Branch (neurology) within 1 week after discharge for hospital follow up and manage down-titration of prednisone. Pt will also follow up with Dr. Adams (vascular surgery) within 1 week after discharge for hospital follow up for left temporal artery biopsy and asymptomatic left carotid stenosis. This patient was seen and examined on the date of discharge and determined suitable for discharge.

## 2018-04-10 NOTE — DISCHARGE SUMMARY
[facility] Hospital Medicine  Discharge Summary      Patient Name: Concepcion Wright  MRN: [id number]  Admission Date: 4/6/2018  Hospital Length of Stay: 0 days  Discharge Date and Time: 4/8/2018  3:10 PM  Attending Physician: No att. providers found   Discharging Provider: GRIS Méndez  Primary Care Provider: Sabrina Mendoza MD      HPI:   Concepcion Wright is a 82 y.o. female patient who presents for left temporal HA.  Onset about 1 month ago and continuing to worsen. Patient reports HA is intermittent, randomly coming and going. No modifying factors. Associated symptoms: Left eye tearing. Pt was evaluated by Dr. Villarreal (neurology)   PTA and referred to ED for carotid US.  No further complaints or concerns at this time. The patient was evaluated in the ER. CT of Head without Contrast per virtual:NAF. Bilateral Carotid US report pending. Of Significance ESR done in clinic PTA >50. Patient with high concern for GCA per neurology, who discussed case with ER. Hospital Medicine consulted. High Dose IV Steroids Started in ER and continued. Patient placed in obs for suspected Temporal GCA.     * No surgery found *      Hospital Course:   Concepcion Wright is a 82 year old female admitted for suspected temporal arteritis. CT head with no acute findings. Neurology consulted and recommended obtaining CTA head/neck. CTA head/neck with left-sided subclavian steal syndrome; the proximalmost aspect of the left subclavian artery is likely completely occluded with calcific plaque; there is 60-70% stenosis of the origin of the left common carotid artery from the arch; 60-70% stenosis of the proximal aspect of the right subclavian artery; the right internal carotid artery demonstrates focal stenosis of 50-60%; and the proximal aspect of the left internal carotid artery is stenotic to 60-70%. Due to findings of CTA head/neck, Vascular surgery consulted and recommending continuing ASA/Plavix/Statin for asymptomatic  left carotid stenosis. US Doppler middle cerebral artery showed a clear asymmetric narrowing of the vessel lumen of the left temporal artery compared to the normal appearing right temporal artery and a hypoechoic halo sign in the left temporal artery, all characteristic of left temporal arteritis. Neurology recommended to start Prednisone 60 mg PO daily and schedule left temporal artery biopsy with vascular surgery. Headache has improved with steroids. Pt will follow up with Dr. Branch (neurology) within 1 week after discharge for hospital follow up and manage down-titration of prednisone. Pt will also follow up with Dr. Adams (vascular surgery) within 1 week after discharge for hospital follow up for left temporal artery biopsy and asymptomatic left carotid stenosis. This patient was seen and examined on the date of discharge and determined suitable for discharge.        Consults:   Consults         Status Ordering Provider     Inpatient consult to Vascular Surgery  Once     Provider:  Estrada Adams IV, MD    Completed KARY GOODWIN          Final Active Diagnoses:    Diagnosis Date Noted POA    PRINCIPAL PROBLEM:  Temporal arteritis, suspected [M31.6] 04/07/2018 Yes    Headache [R51] 04/07/2018 Yes    Diabetes mellitus without complication [E11.9] 03/06/2018 Yes    Essential hypertension, benign [I10] 03/06/2018 Yes    Chronic kidney disease (CKD), stage III (moderate) [N18.3] 10/18/2017 Yes      Problems Resolved During this Admission:    Diagnosis Date Noted Date Resolved POA       Discharged Condition: stable    Disposition: Home or Self Care    Follow Up:  Follow-up Information     Sabrina Mendoza MD. Schedule an appointment as soon as possible for a visit in 3 days.    Specialty:  Internal Medicine  Why:  hospital follow up  Contact information:  [address] [facility] [address] [phone]             Rudy Branch MD. Schedule an appointment as soon as possible for a visit in 1  week.    Specialty:  Neurology  Why:  hospital follow up  Contact information:  [address]  [place] [id number] [phone]             Estrada Adams IV, MD. Schedule an appointment as soon as possible for a visit in 1 week.    Specialty:  Vascular Surgery  Why:  hospital follow up  Contact information:  [address]  [address]RD FLO[address]  [facility]  [place] [address]  [phone]                 Patient Instructions:     Activity as tolerated     Notify your health care provider if you experience any of the following:  increased confusion or weakness     Notify your health care provider if you experience any of the following:  persistent dizziness, light-headedness, or visual disturbances     Notify your health care provider if you experience any of the following:  difficulty breathing or increased cough         Significant Diagnostic Studies: Labs:   CMP     Recent Labs  Lab 04/08/18  0500      K 4.1      CO2 23   *   BUN 25*   CREATININE 1.4   CALCIUM 9.6   PROT 7.9   ALBUMIN 3.4*   BILITOT 0.3   ALKPHOS 63   AST 23   ALT 15   ANIONGAP 11   ESTGFRAFRICA 41*   EGFRNONAA 36*    and CBC     Recent Labs  Lab 04/08/18  0500   WBC 9.99   HGB 11.0*   HCT 34.0*        Radiology:   Imaging Results          US DOPPLER MIDDLE CEREBRAL ARTERY (Final result)  Result time 04/07/18 18:38:46   Procedure changed from US Soft Tissue Head Neck Thyroid     Final result by Emmie Randolph MD (04/07/18 18:38:46)                 Impression:     There is clear asymmetric narrowing of the vessel lumen of the left temporal artery compared to the normal appearing right temporal artery and a hypoechoic halo sign in the left temporal artery, all characteristic of left temporal arteritis.      Electronically signed by: EMMIE RANDOLPH MD  Date:     04/07/18  Time:    18:38              Narrative:    Ultrasound Doppler temporal arteries    Indication: Left-sided headaches.  Clinical concern for  left-sided temporal arteritis.    Findings: The right temporal artery has a normal appearance with a normal wall thickness.  In comparison the left temporal artery shows a hypoechoic halo sign with narrowing of the lumen and thickening of the wall especially on color Doppler assessment.  There is no occlusion of the left temporal artery and the peak systolic velocity measurement of the left temporal artery is 30 cm/s.  Incidentally the peak systolic velocity measurement of the right temporal artery is 42 cm/s.                             CTA Head and Neck (xpd) (Final result)  Result time 04/07/18 12:29:18    Final result by Jeremiah Martinez Jr., MD (04/07/18 12:29:18)                 Impression:         1.  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  The proximalmost aspect of the left subclavian artery is likely completely occluded with calcific plaque.  Ultrasound evaluation may be confirmatory.    2.  The right vertebral artery terminates in PICA, a variant of normal.    3.  There is 60-70% stenosis of the origin of the left common carotid artery from the arch.    4.  There is also 60-70% stenosis of the proximal aspect of the right subclavian artery.    5.  The right internal carotid artery demonstrates focal stenosis of 50-60%.  The proximal aspect of the left internal carotid artery is stenotic to 60-70%.  This relates to calcific plaque.    6.  Moderate, focal stenosis of the origin of the left vertebral artery at its origin from the left subclavian artery.    All CT scans at this facility use dose modulation, iterative reconstruction, and/or weight base dosing when appropriate to reduce radiation dose to as low as reasonably achievable.       Electronically signed by: JEREMIAH MARTINEZ M.D.  Date:     04/07/18  Time:    12:29              Narrative:    EXAM: [id number] HEAD AND NECK (XPD)    CLINICAL HISTORY: evaluate for subclavian steal     TECHNIQUE: Standard CTA of the  intracranial and extracranial circulation was performed after the administration of intravenous contrast in the arterial phase. This examination was interpreted using multiplanar and 3D reconstructions.    COMPARISON: None    FINDINGS:    Extracranial:    There is extensive calcific atheromatous change of the aortic arch and involving the proximal aspect of the vessels originating from the arch.  There is extensive calcific plaque involving the origin of the left common carotid artery with 60-70% stenosis.  There is very dense and calcification of the origin of the left subclavian artery which is likely completely occluded.  The left vertebral artery originates from the left subclavian artery just beyond this region as may be seen with subclavian steal syndrome. There is also extensive calcific plaque involving the proximal aspect of the right subclavian artery with moderate irregular luminal narrowing on the order of 60-70%.  The right vertebral artery originates beyond the region of stenosis.    The right common carotid demonstrate scattered calcific plaque without significant stenosis.  There is calcific plaque involving the carotid bulb and proximal aspect of the right internal carotid artery with focal stenosis of 50-60%.  This is best demonstrated on axial series 2 image 126.  The more cranial, ascending cervical right internal carotid artery is of a more normal course and caliber.    The left common carotid demonstrate scattered foci of calcific atheromatous plaque with mild irregular luminal narrowing as it ascends within the neck.  There is also extensive calcific plaque within the left carotid bulb which narrows the left internal carotid artery to 60-70%.  This is best demonstrated on axial series 2 image 129.    Extracranial vertebral arteries: The left vertebral artery is dominant. There is coarse calcific plaque involving the origin of the left vertebral artery with moderate narrowing of its lumen.  No  definite stenosis of the origin of the right vertebral artery.  The more cranial, ascending cervical portions of the vertebral arteries are of a more normal course and caliber.    Intracranial:     The right intracranial internal carotid artery demonstrates calcific atheromatous plaque within the cavernous segment with moderate irregular luminal narrowing. The left intracranial internal carotid artery also demonstrates moderate irregular luminal narrowing relating to calcific plaque within the cavernous segment. The right middle cerebral artery is normal. The left middle cerebral artery is normal. The right anterior cerebral artery is normal. The left anterior cerebral artery is normal. Anterior communicating artery is normal. The right posterior cerebral artery is normal. The left posterior cerebral artery is normal. There is a patent left-sided posterior communicating artery.  No definite right-sided posterior communicating artery. No evidence of aneurysm.     The basilar artery is normal. There is calcific plaque involving the V4 segments of the vertebral arteries, left more extensive than right, with mild luminal narrowing.  The right vertebral artery likely terminates in PICA. The superior cerebellar arteries are normal. The anterior inferior cerebellar arteries are normal. The posterior inferior cerebellar arteries are normal.     There are degenerative spine changes.                             CT Head Without Contrast (Final result)  Result time 04/07/18 12:46:56    Final result by Jeremiah Martinez Jr., MD (04/07/18 12:46:56)                 Impression:     No acute intracranial CT abnormality.    All CT scans at this facility use dose modulation, iterative reconstruction, and/or weight base dosing when appropriate to reduce radiation dose to as low as reasonably achievable.       Electronically signed by: JEREMIAH MARTINEZ M.D.  Date:     04/07/18  Time:    12:46              Narrative:    EXAM: [id number] HEAD  WITHOUT CONTRAST    CLINICAL HISTORY: Headache, chronic, no new features, norm neuro exam     TECHNIQUE: Contiguous axial images were obtained from the skull base through the vertex without intravenous contrast.    COMPARISON: Prior CT of the head from 10/10/2017.    FINDINGS: No intracranial hemorrhage. No mass effect or midline shift. No extra axial fluid collections. No areas of abnormal parenchymal attenuation. The ventricles and sulci are normal in size and configuration. There is no evidence of hydrocephalus. The pineal region is unremarkable. The posterior fossa structures are grossly unremarkable within the limits of CT scan. The paranasal sinuses and mastoid air cells are clear. No fractures are identified. No concerning osseous lesions.                              Pending Diagnostic Studies:     None         Medications:  Reconciled Home Medications:      Medication List      START taking these medications    predniSONE 20 MG tablet  Commonly known as:  DELTASONE  Take 3 tablets (60 mg total) by mouth once daily.        CHANGE how you take these medications    atorvastatin 80 MG tablet  Commonly known as:  LIPITOR  Take 1 tablet (80 mg total) by mouth once daily.  What changed:  · medication strength  · how much to take        CONTINUE taking these medications    amlodipine-benazepril 10-20mg 10-20 mg per capsule  Commonly known as:  LOTREL  Take 1 capsule by mouth once daily.     aspirin 81 MG Chew     cholecalciferol (vitamin D3) 50,000 unit capsule  1 CAP(S) BY MOUTH WEEKLY     clopidogrel 75 mg tablet  Commonly known as:  PLAVIX  Take 1 tablet (75 mg total) by mouth once daily.     cyclobenzaprine 10 MG tablet  Commonly known as:  FLEXERIL     ferrous gluconate 324 mg (37.5 mg iron) Tab  Take 1 tablet (324 mg total) by mouth once daily.     furosemide 20 MG tablet  Commonly known as:  LASIX  TAKE 1 TABLET BY MOUTH DAILY     hydrocodone-acetaminophen 7.5-325mg 7.5-325 mg per tablet  Commonly known as:   NORCO     linagliptin 5 mg Tab tablet  Commonly known as:  TRADJENTA  Take 1 tablet (5 mg total) by mouth once daily.        STOP taking these medications    methylPREDNISolone 4 mg tablet  Commonly known as:  MEDROL DOSEPACK           Where to Get Your Medications      These medications were sent to [id number] - [place], [place] - [address], [place] [address]    Phone:  [phone]   · atorvastatin 80 MG tablet  · predniSONE 20 MG tablet         Indwelling Lines/Drains at time of discharge:   Lines/Drains/Airways          No matching active lines, drains, or airways          Time spent on the discharge of patient: 35 minutes  Patient was seen and examined on the date of discharge and determined to be suitable for discharge.         GRIS Méndez  Department of Hospital Medicine  [facility]

## 2018-04-17 PROBLEM — E11.00 TYPE 2 DIABETES MELLITUS WITH HYPEROSMOLARITY WITHOUT COMA, WITHOUT LONG-TERM CURRENT USE OF INSULIN: Status: ACTIVE | Noted: 2018-04-17

## 2018-04-17 PROBLEM — Z79.899 HIGH RISK MEDICATION USE: Status: ACTIVE | Noted: 2018-04-17

## 2018-04-17 PROBLEM — R79.9 ABNORMAL BLOOD CHEMISTRY: Status: ACTIVE | Noted: 2018-04-17

## 2018-06-07 ENCOUNTER — LAB VISIT (OUTPATIENT)
Dept: LAB | Facility: HOSPITAL | Age: 83
End: 2018-06-07
Attending: PSYCHIATRY & NEUROLOGY
Payer: MEDICARE

## 2018-06-07 ENCOUNTER — OFFICE VISIT (OUTPATIENT)
Dept: NEUROLOGY | Facility: CLINIC | Age: 83
End: 2018-06-07
Payer: MEDICARE

## 2018-06-07 VITALS
DIASTOLIC BLOOD PRESSURE: 58 MMHG | HEART RATE: 80 BPM | RESPIRATION RATE: 20 BRPM | HEIGHT: 63 IN | SYSTOLIC BLOOD PRESSURE: 98 MMHG | BODY MASS INDEX: 25.7 KG/M2 | WEIGHT: 145.06 LBS

## 2018-06-07 DIAGNOSIS — R51.9 HEADACHE DISORDER: ICD-10-CM

## 2018-06-07 DIAGNOSIS — R51.9 HEADACHE DISORDER: Primary | ICD-10-CM

## 2018-06-07 LAB — ERYTHROCYTE [SEDIMENTATION RATE] IN BLOOD BY WESTERGREN METHOD: 45 MM/HR

## 2018-06-07 PROCEDURE — 3078F DIAST BP <80 MM HG: CPT | Mod: CPTII,S$GLB,, | Performed by: PSYCHIATRY & NEUROLOGY

## 2018-06-07 PROCEDURE — 85651 RBC SED RATE NONAUTOMATED: CPT | Mod: PO

## 2018-06-07 PROCEDURE — 36415 COLL VENOUS BLD VENIPUNCTURE: CPT | Mod: PO

## 2018-06-07 PROCEDURE — 3074F SYST BP LT 130 MM HG: CPT | Mod: CPTII,S$GLB,, | Performed by: PSYCHIATRY & NEUROLOGY

## 2018-06-07 PROCEDURE — 99999 PR PBB SHADOW E&M-EST. PATIENT-LVL III: CPT | Mod: PBBFAC,,, | Performed by: PSYCHIATRY & NEUROLOGY

## 2018-06-07 PROCEDURE — 99215 OFFICE O/P EST HI 40 MIN: CPT | Mod: S$GLB,,, | Performed by: PSYCHIATRY & NEUROLOGY

## 2018-06-07 NOTE — PROGRESS NOTES
Progress note  Neurology      Neurology follow up:  For:   1. Headache   2. Temporal arteritis     SUBJECTIVE:      HPI:     No headache now and she ws on Prednisone . Temporal artery biopsy was done but there is no report at this time. She is not taking any medication at this time. In the Hospital , she had CTA done and showed PAD in the Carotid and Subclavian arteries.      Past Medical History:   Diagnosis Date    Anemia     Coronary artery disease     Diabetes mellitus     Diabetes mellitus type I     Hyperlipidemia     Hypertension     PAD (peripheral artery disease)     Spinal stenosis      Past Surgical History:   Procedure Laterality Date    FEMORAL ARTERY STENT       Family History   Problem Relation Age of Onset    Hypertension Mother     Diabetes Mother     Hypertension Father     Diabetes Father     Hypertension Sister     Diabetes Sister     Hypertension Brother     Diabetes Brother     Hypertension Daughter     Diabetes Daughter      Social History   Substance Use Topics    Smoking status: Former Smoker    Smokeless tobacco: Never Used    Alcohol use No       Review of patient's allergies indicates:  No Known Allergies   Patient Active Problem List   Diagnosis    Iron deficiency    Chronic kidney disease (CKD), stage III (moderate)    Proteinuria    Diabetes mellitus without complication    Anemia    Essential hypertension, benign    Vitamin D deficiency    Bilateral carotid bruits    Carotid stenosis, left    Left temporal headache    Temporal arteritis, suspected    Headache    Type 2 diabetes mellitus with hyperosmolarity without coma, without long-term current use of insulin    High risk medication use    Abnormal blood chemistry       Review of Systems   Constitutional: Negative for fever and weight loss.   HENT: Negative for hearing loss.    Eyes: Negative for blurred vision, double vision, photophobia and pain.   Respiratory: Negative for cough.     Cardiovascular: Negative for chest pain.   Gastrointestinal: Negative for abdominal pain, nausea and vomiting.   Genitourinary: Negative for dysuria, frequency and urgency.   Musculoskeletal: Negative.  Negative for back pain, falls, joint pain, myalgias and neck pain.   Skin: Negative for itching and rash.   Neurological: Negative for tingling and headaches.   Psychiatric/Behavioral: Negative for depression and memory loss.         OBJECTIVE:     Vital Signs (Most Recent)  Pulse: 80 (06/07/18 1416)  Resp: 20 (06/07/18 1416)  BP: (!) 98/58 (06/07/18 1416)    Physical Exam   Constitutional: She is oriented to person, place, and time. She appears well-developed and well-nourished.   HENT:   Head: Normocephalic and atraumatic.   Eyes: Conjunctivae and EOM are normal. Pupils are equal, round, and reactive to light.   Neck: Normal range of motion. Neck supple. No JVD present. No tracheal deviation present. No thyromegaly present.   Cardiovascular: Normal rate, regular rhythm and normal heart sounds.    Pulmonary/Chest: Effort normal and breath sounds normal.   Abdominal: She exhibits no distension. There is no tenderness.   Musculoskeletal: Normal range of motion. She exhibits no edema or tenderness.   Neurological: She is alert and oriented to person, place, and time. She has normal strength and normal reflexes. She displays normal reflexes. No cranial nerve deficit or sensory deficit. She exhibits normal muscle tone. She displays a negative Romberg sign. Coordination and gait normal.   Reflex Scores:       Tricep reflexes are 2+ on the right side and 2+ on the left side.       Bicep reflexes are 2+ on the right side and 2+ on the left side.       Brachioradialis reflexes are 2+ on the right side and 2+ on the left side.       Patellar reflexes are 2+ on the right side and 2+ on the left side.       Achilles reflexes are 2+ on the right side and 2+ on the left side.  Skin: Skin is warm and dry. No rash noted.    Psychiatric: She has a normal mood and affect. Her behavior is normal. Judgment and thought content normal.       Strength  Deltoids Triceps Biceps Wrist Extension Wrist Flexion Hand    Upper: R 5/5 5/5 5/5 5/5 5/5 5/5    L 5/5 5/5 5/5 5/5 5/5 5/5     Iliopsoas Quadriceps Knee  Flexion Tibialis  anterior Gastro- cnemius EHL   Lower: R 5/5 5/5 5/5 5/5 5/5 5/5    L 5/5 5/5 5/5 5/5 5/5 5/5       Results for ADDY AYERS (MRN [id number]) as of 6/7/2018 14:42   Ref. Range 4/6/2018 11:16 4/6/2018 23:45 4/6/2018 23:47 4/7/2018 05:53   Sed Rate Latest Ref Range: 0 - 20 mm/Hr 52 (H)   58 (H)           Diagnostic Results:  CTA of head and neck: 4/7/2018  1.  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  The proximalmost aspect of the left subclavian artery is likely completely occluded with calcific plaque.  Ultrasound evaluation may be confirmatory.    2.  The right vertebral artery terminates in PICA, a variant of normal.    3.  There is 60-70% stenosis of the origin of the left common carotid artery from the arch.    4.  There is also 60-70% stenosis of the proximal aspect of the right subclavian artery.    5.  The right internal carotid artery demonstrates focal stenosis of 50-60%.  The proximal aspect of the left internal carotid artery is stenotic to 60-70%.  This relates to calcific plaque.    6.  Moderate, focal stenosis of the origin of the left vertebral artery at its origin from the left subclavian artery.    ASSESSMENT/PLAN:     Assessment:   1. 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 Vascular is now with the patient.  2. Headache: possibly Temporal arteritis s/p steroid treatment  But no steroid now . She is asymptomatic and there is no steroid on board for one and half months. May be we will restart steroid right now unless biopsy came post kirill or ESR show increased.    Patient Active Problem List   Diagnosis    Iron deficiency    Chronic kidney disease (CKD),  stage III (moderate)    Proteinuria    Diabetes mellitus without complication    Anemia    Essential hypertension, benign    Vitamin D deficiency    Bilateral carotid bruits    Carotid stenosis, left    Left temporal headache    Temporal arteritis, suspected    Headache    Type 2 diabetes mellitus with hyperosmolarity without coma, without long-term current use of insulin    High risk medication use    Abnormal blood chemistry         Plan: blood test today.  Will see in 3 months.    Addendum:  Results for ADDY AYERS (MRN [id number]) as of 6/8/2018 07:43   Ref. Range 4/6/2018 23:45 4/6/2018 23:47 4/7/2018 05:53 4/8/2018 05:00 6/7/2018 15:36   Sed Rate Latest Ref Range: 0 - 20 mm/Hr   58 (H)  45 (H)       Later on , ESR came to 45 which is lower than before. Patient is asymptomatic. We decided not to resume steroid.

## 2018-06-08 ENCOUNTER — TELEPHONE (OUTPATIENT)
Dept: NEUROLOGY | Facility: CLINIC | Age: 83
End: 2018-06-08

## 2018-06-10 RX ORDER — ATORVASTATIN CALCIUM 80 MG/1
80 TABLET, FILM COATED ORAL DAILY
Qty: 30 TABLET | Refills: 1 | OUTPATIENT
Start: 2018-06-10 | End: 2019-06-10

## 2018-06-26 PROBLEM — M81.0 AGE-RELATED OSTEOPOROSIS WITHOUT CURRENT PATHOLOGICAL FRACTURE: Status: ACTIVE | Noted: 2018-06-26

## 2018-06-30 RX ORDER — ATORVASTATIN CALCIUM 80 MG/1
80 TABLET, FILM COATED ORAL DAILY
Qty: 30 TABLET | Refills: 1 | OUTPATIENT
Start: 2018-06-30 | End: 2019-06-30

## 2018-08-09 PROBLEM — E78.5 HYPERLIPIDEMIA: Status: ACTIVE | Noted: 2018-08-09

## 2018-11-08 PROBLEM — I95.9 HYPOTENSION: Status: ACTIVE | Noted: 2018-11-08

## 2018-11-08 PROBLEM — Z00.00 MEDICARE ANNUAL WELLNESS VISIT, SUBSEQUENT: Status: ACTIVE | Noted: 2018-11-08

## 2018-12-18 PROBLEM — H02.105 ECTROPION OF LEFT LOWER EYELID: Status: ACTIVE | Noted: 2018-12-18

## 2018-12-18 PROBLEM — Z01.818 PREOP TESTING: Status: ACTIVE | Noted: 2018-11-08

## 2018-12-18 PROBLEM — E03.9 HYPOTHYROIDISM: Status: ACTIVE | Noted: 2018-11-28

## 2018-12-18 PROBLEM — I73.9 PERIPHERAL ARTERIAL DISEASE: Status: ACTIVE | Noted: 2018-11-28

## 2019-02-08 PROBLEM — I50.9 CHF (CONGESTIVE HEART FAILURE): Status: ACTIVE | Noted: 2019-02-08

## 2019-06-27 PROBLEM — E87.6 HYPOKALEMIA: Status: ACTIVE | Noted: 2019-06-27

## 2019-06-27 PROBLEM — R80.8 OTHER PROTEINURIA: Status: ACTIVE | Noted: 2017-10-18

## 2019-07-14 PROBLEM — E87.6 HYPOKALEMIA: Status: RESOLVED | Noted: 2019-06-27 | Resolved: 2019-07-14

## 2019-07-30 ENCOUNTER — OFFICE VISIT (OUTPATIENT)
Dept: ENDOCRINOLOGY | Facility: CLINIC | Age: 84
End: 2019-07-30
Payer: MEDICARE

## 2019-07-30 ENCOUNTER — LAB VISIT (OUTPATIENT)
Dept: LAB | Facility: HOSPITAL | Age: 84
End: 2019-07-30
Attending: INTERNAL MEDICINE
Payer: MEDICARE

## 2019-07-30 VITALS
OXYGEN SATURATION: 99 % | HEART RATE: 84 BPM | SYSTOLIC BLOOD PRESSURE: 130 MMHG | HEIGHT: 63 IN | DIASTOLIC BLOOD PRESSURE: 72 MMHG | BODY MASS INDEX: 25.58 KG/M2 | WEIGHT: 144.38 LBS

## 2019-07-30 DIAGNOSIS — N18.30 CHRONIC KIDNEY DISEASE (CKD), STAGE III (MODERATE): ICD-10-CM

## 2019-07-30 DIAGNOSIS — E05.90 HYPERTHYROIDISM: Primary | ICD-10-CM

## 2019-07-30 DIAGNOSIS — D64.9 ANEMIA, UNSPECIFIED TYPE: ICD-10-CM

## 2019-07-30 DIAGNOSIS — E05.90 HYPERTHYROIDISM: ICD-10-CM

## 2019-07-30 PROCEDURE — 84439 ASSAY OF FREE THYROXINE: CPT

## 2019-07-30 PROCEDURE — 3078F PR MOST RECENT DIASTOLIC BLOOD PRESSURE < 80 MM HG: ICD-10-PCS | Mod: CPTII,S$GLB,, | Performed by: INTERNAL MEDICINE

## 2019-07-30 PROCEDURE — 1101F PR PT FALLS ASSESS DOC 0-1 FALLS W/OUT INJ PAST YR: ICD-10-PCS | Mod: CPTII,S$GLB,, | Performed by: INTERNAL MEDICINE

## 2019-07-30 PROCEDURE — 99999 PR PBB SHADOW E&M-EST. PATIENT-LVL III: ICD-10-PCS | Mod: PBBFAC,,, | Performed by: INTERNAL MEDICINE

## 2019-07-30 PROCEDURE — 3075F SYST BP GE 130 - 139MM HG: CPT | Mod: CPTII,S$GLB,, | Performed by: INTERNAL MEDICINE

## 2019-07-30 PROCEDURE — 84443 ASSAY THYROID STIM HORMONE: CPT

## 2019-07-30 PROCEDURE — 1101F PT FALLS ASSESS-DOCD LE1/YR: CPT | Mod: CPTII,S$GLB,, | Performed by: INTERNAL MEDICINE

## 2019-07-30 PROCEDURE — 99204 OFFICE O/P NEW MOD 45 MIN: CPT | Mod: S$GLB,,, | Performed by: INTERNAL MEDICINE

## 2019-07-30 PROCEDURE — 3078F DIAST BP <80 MM HG: CPT | Mod: CPTII,S$GLB,, | Performed by: INTERNAL MEDICINE

## 2019-07-30 PROCEDURE — 84445 ASSAY OF TSI GLOBULIN: CPT

## 2019-07-30 PROCEDURE — 36415 COLL VENOUS BLD VENIPUNCTURE: CPT

## 2019-07-30 PROCEDURE — 99999 PR PBB SHADOW E&M-EST. PATIENT-LVL III: CPT | Mod: PBBFAC,,, | Performed by: INTERNAL MEDICINE

## 2019-07-30 PROCEDURE — 99204 PR OFFICE/OUTPT VISIT, NEW, LEVL IV, 45-59 MIN: ICD-10-PCS | Mod: S$GLB,,, | Performed by: INTERNAL MEDICINE

## 2019-07-30 PROCEDURE — 84481 FREE ASSAY (FT-3): CPT

## 2019-07-30 PROCEDURE — 3075F PR MOST RECENT SYSTOLIC BLOOD PRESS GE 130-139MM HG: ICD-10-PCS | Mod: CPTII,S$GLB,, | Performed by: INTERNAL MEDICINE

## 2019-07-30 NOTE — PROGRESS NOTES
Subjective:       Patient ID: Mariposa Wright is a 84 y.o. female.  Patient is new to me  Chief Complaint: Hyperthyroidism    HPI    Consultation was requested by Dr. Sabrina Mendoza       Diagnosed:  Patient noted to labs consistent with hyperthyroidism in June.  It appears that she was 1st placed on Cytomel around August 2018 and then earlier in the year was switched to Synthroid but patient insists that she has not been taking  Denies any weight loss or fevers or recent illness    Patient is here with relative    Previous radiology tests:  Thyroid ultrasound : No   NM uptake and scan: No    Previous thyroid surgery: No      Thyroid symptoms:denies fatigue, weight changes, heat/cold intolerance, bowel/skin changes or CVS symptoms      Thyroid medications: none      Takes medication appropriately: n/a    I have reviewed the past medical, family and social history  Review of Systems   Constitutional: Negative for appetite change, fatigue, fever and unexpected weight change.   HENT: Negative for sore throat and trouble swallowing.    Eyes: Negative for visual disturbance.   Respiratory: Negative for shortness of breath and wheezing.    Cardiovascular: Negative for chest pain, palpitations and leg swelling.   Gastrointestinal: Negative for diarrhea, nausea and vomiting.   Endocrine: Negative for cold intolerance, heat intolerance, polydipsia, polyphagia and polyuria.   Genitourinary: Negative for difficulty urinating, dysuria and menstrual problem.   Musculoskeletal: Negative for arthralgias and joint swelling.   Skin: Negative for rash.   Neurological: Negative for dizziness, weakness, numbness and headaches.   Psychiatric/Behavioral: Negative for confusion, dysphoric mood and sleep disturbance.       Objective:      Physical Exam   Constitutional: She is oriented to person, place, and time. She appears well-developed and well-nourished. No distress.   HENT:   Head: Normocephalic and atraumatic.   Right Ear: External  ear normal.   Left Ear: External ear normal.   Nose: Nose normal.   Mouth/Throat: Oropharynx is clear and moist. No oropharyngeal exudate.   Eyes: Pupils are equal, round, and reactive to light. Conjunctivae and EOM are normal. No scleral icterus.   Neck: No JVD present. No tracheal deviation present. No thyromegaly present.   Cardiovascular: Normal rate, regular rhythm, normal heart sounds and intact distal pulses. Exam reveals no gallop and no friction rub.   No murmur heard.  Pulmonary/Chest: Effort normal and breath sounds normal. No respiratory distress. She has no wheezes. She has no rales.   Abdominal: Soft. Bowel sounds are normal. She exhibits no distension and no mass. There is no tenderness. There is no rebound and no guarding. No hernia.   Musculoskeletal: She exhibits no edema or deformity.   Lymphadenopathy:     She has no cervical adenopathy.   Neurological: She is alert and oriented to person, place, and time. She has normal reflexes. No cranial nerve deficit.   No tremor   Skin: Skin is warm. No rash noted. No erythema.   Psychiatric: She has a normal mood and affect. Her behavior is normal.   Vitals reviewed.        Lab Review:   Lab Results   Component Value Date    TSH 0.007 (L) 06/27/2019    TSH <0.006 (L) 06/13/2019    TSH 1.680 02/27/2019    TSH 1.540 11/08/2018     No results found for: FREET4  No components found for: FREET3      Assessment:     1. Hyperthyroidism  T4, free    T3, free    TSH    Thyroid stimulating immunoglobulin    NM Thyroid Uptake and Scan    Misc Sendout Test, Blood please check levels of drug called synthroid or levothyroxine    CANCELED: Thyroid peroxidase antibody    CANCELED: Anti-thyroglobulin antibody   2. Chronic kidney disease (CKD), stage III (moderate)     3. Anemia, unspecified type      patient with hyperthyroidism.  Consider thyroiditis versus Graves disease versus thyroid nodule.  Will get labs below and thyroid uptake and scan for further evaluation.   Currently asymptomatic and blood pressure and heart rate are controlled but I advised patient and her relative that should she develop any severe symptoms such as persisting tachycardia with palpitations or shortness of breath and to seek emergency care  Plan:   Hyperthyroidism  -     T4, free; Future; Expected date: 07/30/2019  -     T3, free; Future; Expected date: 07/30/2019  -     TSH; Future; Expected date: 07/30/2019  -     Cancel: Thyroid peroxidase antibody; Future; Expected date: 07/30/2019  -     Thyroid stimulating immunoglobulin; Future; Expected date: 07/30/2019  -     Cancel: Anti-thyroglobulin antibody; Future; Expected date: 07/30/2019  -     NM Thyroid Uptake and Scan; Future; Expected date: 07/30/2019  -     Misc Sendout Test, Blood please check levels of drug called synthroid or levothyroxine; Future; Expected date: 07/30/2019    Chronic kidney disease (CKD), stage III (moderate)    Anemia, unspecified type        No follow-ups on file.     Thank you to Dr. Sabrina Mendoza for this consultation      Disclaimer:  This note may have been partially prepared using voice recognition software and  it may have not been extensively proofed, as such there could be errors within the text such as sound alike errors.

## 2019-07-30 NOTE — LETTER
July 30, 2019      Sabrina Mendoza MD  [id number]  [place] [address]           [facility]  [address]  [place] [address]  Phone: [phone]  Fax: [phone]          Patient: Mariposa Wright   MR Number: [id number]   YOB: 1935   Date of Visit: 7/30/2019       Dear Dr. Sabrina Mendoza:    Thank you for referring Mariposa Wright to me for evaluation. Attached you will find relevant portions of my assessment and plan of care.    If you have questions, please do not hesitate to call me. I look forward to following Mariposa Wright along with you.    Sincerely,    Nan Adams MD    Enclosure  CC:  No Recipients    If you would like to receive this communication electronically, please contact [email or web address] or [phone] to request more information on [employer] access.    For providers and/or their staff who would like to refer a patient to [facility], please contact us through our one-stop-shop provider referral line, [facility], at [phone].    If you feel you have received this communication in error or would no longer like to receive these types of communications, please e-mail [email or web address]

## 2019-07-31 LAB
T3FREE SERPL-MCNC: 2.8 PG/ML (ref 2.3–4.2)
T4 FREE SERPL-MCNC: 1.19 NG/DL (ref 0.71–1.51)
TSH SERPL DL<=0.005 MIU/L-ACNC: 0.01 UIU/ML (ref 0.4–4)

## 2019-08-02 LAB — TSI SER-ACNC: <0.1 IU/L

## 2019-09-25 ENCOUNTER — DOCUMENTATION ONLY (OUTPATIENT)
Dept: ENDOCRINOLOGY | Facility: CLINIC | Age: 84
End: 2019-09-25

## 2019-09-25 NOTE — PROGRESS NOTES
I was notified by nursing staff that patient and family are not interested in going further with workup with thyroid uptake and scan to workup her hyperthyroidism.  They were explained the complications of untreated hyperthyroidism at the visit.

## 2019-11-13 PROBLEM — I77.1 TORTUOUS ARTERY: Status: ACTIVE | Noted: 2018-12-06

## 2022-09-08 NOTE — H&P
Alondra. Followed by Dr. Trav Ramirez. [facility] Hospital Medicine  History & Physical    Patient Name: Concepcion Wright  MRN: [id number]  Admission Date: 4/6/2018  Attending Physician: Jan Adamson MD  Primary Care Provider: Sabrina Mendoza MD         Patient information was obtained from patient, past medical records and ER records.     Subjective:     Principal Problem:Temporal arteritis    Chief Complaint:   Chief Complaint   Patient presents with    Headache     L sided HA worsening over past 3weeks; seen by Dr Villarreal, neurology, today and sent here for carotid US        HPI: Concepcion Wright is a 82 y.o. female patient who presents for left temporal HA.  Onset about 1 month ago and continuing to worsen. Patient reports HA is intermittent, randomly coming and going. No modifying factors. Associated symptoms: Left eye tearing. Pt was evaluated by Dr. Villarreal (neurology)   PTA and referred to ED for carotid US.  No further complaints or concerns at this time. The patient was evaluated in the ER. CT of Head without Contrast per virtual:NAF. Bilateral Carotid US report pending. Of Significance ESR done in clinic PTA >50. Patient with high concern for GCA per neurology, who discussed case with ER. Hospital Medicine consulted. High Dose IV Steroids Started in ER and continued. Patient placed in obs for suspected Temporal GCA.     Past Medical History:   Diagnosis Date    Anemia     Coronary artery disease     Diabetes mellitus     Diabetes mellitus type I     Hyperlipidemia     Hypertension     PAD (peripheral artery disease)     Spinal stenosis        Past Surgical History:   Procedure Laterality Date    FEMORAL ARTERY STENT         Review of patient's allergies indicates:  No Known Allergies    No current facility-administered medications on file prior to encounter.      Current Outpatient Prescriptions on File Prior to Encounter   Medication Sig    amlodipine-benazepril 10-20mg (LOTREL) 10-20 mg per capsule Take 1 capsule by  mouth once daily.    aspirin 81 MG Chew Take 81 mg by mouth once daily.    atorvastatin (LIPITOR) 20 MG tablet Take 1 tablet (20 mg total) by mouth once daily.    cholecalciferol, vitamin D3, 50,000 unit capsule 1 CAP(S) BY MOUTH WEEKLY    clopidogrel (PLAVIX) 75 mg tablet Take 1 tablet (75 mg total) by mouth once daily.    cyclobenzaprine (FLEXERIL) 10 MG tablet Take 10 mg by mouth 3 (three) times daily as needed for Muscle spasms.    ferrous gluconate 324 mg (37.5 mg iron) Tab Take 1 tablet (324 mg total) by mouth once daily.    furosemide (LASIX) 20 MG tablet TAKE 1 TABLET BY MOUTH DAILY    hydrocodone-acetaminophen 7.5-325mg (NORCO) 7.5-325 mg per tablet Take 1 tablet by mouth every 6 (six) hours as needed for Pain.    linagliptin (TRADJENTA) 5 mg Tab tablet Take 1 tablet (5 mg total) by mouth once daily.    methylPREDNISolone (MEDROL DOSEPACK) 4 mg tablet use as directed     Family History     Problem Relation (Age of Onset)    Diabetes Mother, Father, Sister, Brother, Daughter    Hypertension Mother, Father, Sister, Brother, Daughter        Social History Main Topics    Smoking status: Former Smoker    Smokeless tobacco: Never Used    Alcohol use No    Drug use: No    Sexual activity: Not on file     Review of Systems   Constitutional: Negative for chills, diaphoresis, fatigue and fever.   HENT: Negative for congestion, sore throat and voice change.    Eyes: Negative for photophobia, pain, redness and visual disturbance.        Positive left eye tearing.    Respiratory: Negative for cough, shortness of breath, wheezing and stridor.    Cardiovascular: Negative for chest pain and leg swelling.   Gastrointestinal: Negative for abdominal distention, abdominal pain, constipation, diarrhea, nausea and vomiting.   Endocrine: Negative for polydipsia, polyphagia and polyuria.   Genitourinary: Negative for difficulty urinating, dysuria, flank pain, pelvic pain, urgency and vaginal discharge.    Musculoskeletal: Negative for back pain, joint swelling, neck pain and neck stiffness.   Skin: Negative for color change and rash.   Allergic/Immunologic: Negative for immunocompromised state.   Neurological: Positive for headaches. Negative for dizziness, syncope, weakness and numbness.   Hematological: Does not bruise/bleed easily.   Psychiatric/Behavioral: Negative for agitation, behavioral problems and confusion.     Objective:     Vital Signs (Most Recent):  Temp: 99 °F (37.2 °C) (04/06/18 2053)  Pulse: 86 (04/07/18 0248)  Resp: 19 (04/07/18 0248)  BP: 134/63 (04/07/18 0248)  SpO2: 98 % (04/07/18 0248) Vital Signs (24h Range):  Temp:  [99 °F (37.2 °C)] 99 °F (37.2 °C)  Pulse:  [74-89] 86  Resp:  [12-20] 19  SpO2:  [96 %-98 %] 98 %  BP: (101-154)/(60-67) 134/63     Weight: 66.9 kg (147 lb 7.8 oz)  Body mass index is 26.13 kg/m².    Physical Exam   Constitutional: She is oriented to person, place, and time. She appears well-developed. No distress.   Elderly    HENT:   Head: Normocephalic and atraumatic.   Nose: Nose normal.   Eyes: Conjunctivae and EOM are normal. Pupils are equal, round, and reactive to light. No scleral icterus.   Neck: Normal range of motion. Neck supple. No tracheal deviation present.   Cardiovascular: Normal rate, regular rhythm, normal heart sounds and intact distal pulses.    No murmur heard.  Pulmonary/Chest: Effort normal and breath sounds normal. No stridor. No respiratory distress. She has no wheezes. She has no rales.   Abdominal: Soft. Bowel sounds are normal. She exhibits no distension. There is no tenderness. There is no guarding.   Genitourinary:   Genitourinary Comments: Deferred no complaint   Musculoskeletal: Normal range of motion. She exhibits no edema or deformity.   Neurological: She is alert and oriented to person, place, and time. No cranial nerve deficit or sensory deficit. Coordination normal.   Locates pain to left temporal    Skin: Skin is warm and dry. Capillary  refill takes less than 2 seconds. No rash noted. She is not diaphoretic.   Psychiatric: She has a normal mood and affect. Her behavior is normal. Judgment and thought content normal.   Nursing note and vitals reviewed.        CRANIAL NERVES     CN III, IV, VI   Pupils are equal, round, and reactive to light.  Extraocular motions are normal.        Significant Labs: All pertinent labs within the past 24 hours have been reviewed.  Results for orders placed or performed during the hospital encounter of 04/06/18   CBC auto differential   Result Value Ref Range    WBC 6.32 3.90 - 12.70 K/uL    RBC 3.51 (L) 4.00 - 5.40 M/uL    Hemoglobin 11.0 (L) 12.0 - 16.0 g/dL    Hematocrit 35.1 (L) 37.0 - 48.5 %     (H) 82 - 98 fL    MCH 31.3 (H) 27.0 - 31.0 pg    MCHC 31.3 (L) 32.0 - 36.0 g/dL    RDW 14.9 (H) 11.5 - 14.5 %    Platelets 321 150 - 350 K/uL    MPV 10.0 9.2 - 12.9 fL    Gran # (ANC) 3.6 1.8 - 7.7 K/uL    Lymph # 1.8 1.0 - 4.8 K/uL    Mono # 0.7 0.3 - 1.0 K/uL    Eos # 0.2 0.0 - 0.5 K/uL    Baso # 0.04 0.00 - 0.20 K/uL    Gran% 56.3 38.0 - 73.0 %    Lymph% 29.0 18.0 - 48.0 %    Mono% 11.1 4.0 - 15.0 %    Eosinophil% 3.0 0.0 - 8.0 %    Basophil% 0.6 0.0 - 1.9 %    Differential Method Automated    Comprehensive metabolic panel   Result Value Ref Range    Sodium 139 136 - 145 mmol/L    Potassium 3.8 3.5 - 5.1 mmol/L    Chloride 103 95 - 110 mmol/L    CO2 26 23 - 29 mmol/L    Glucose 110 70 - 110 mg/dL    BUN, Bld 20 8 - 23 mg/dL    Creatinine 1.5 (H) 0.5 - 1.4 mg/dL    Calcium 9.6 8.7 - 10.5 mg/dL    Total Protein 8.5 (H) 6.0 - 8.4 g/dL    Albumin 3.6 3.5 - 5.2 g/dL    Total Bilirubin 0.5 0.1 - 1.0 mg/dL    Alkaline Phosphatase 69 55 - 135 U/L    AST 20 10 - 40 U/L    ALT 13 10 - 44 U/L    Anion Gap 10 8 - 16 mmol/L    eGFR if African American 38 (A) >60 mL/min/1.73 m^2    eGFR if non African American 33 (A) >60 mL/min/1.73 m^2   Urinalysis   Result Value Ref Range    Specimen UA Urine, Clean Catch     Color, UA  Yellow Yellow, Straw, Brittani    Appearance, UA Clear Clear    pH, UA 7.0 5.0 - 8.0    Specific Gravity, UA 1.015 1.005 - 1.030    Protein, UA Trace (A) Negative    Glucose, UA Negative Negative    Ketones, UA Negative Negative    Bilirubin (UA) Negative Negative    Occult Blood UA Trace (A) Negative    Nitrite, UA Negative Negative    Urobilinogen, UA Negative <2.0 EU/dL    Leukocytes, UA Negative Negative   Urinalysis   Result Value Ref Range    Specimen UA Urine, Clean Catch     Color, UA Yellow Yellow, Straw, Brittani    Appearance, UA Clear Clear    pH, UA 7.0 5.0 - 8.0    Specific Gravity, UA 1.015 1.005 - 1.030    Protein, UA Trace (A) Negative    Glucose, UA Negative Negative    Ketones, UA Negative Negative    Bilirubin (UA) Negative Negative    Occult Blood UA Trace (A) Negative    Nitrite, UA Negative Negative    Urobilinogen, UA Negative <2.0 EU/dL    Leukocytes, UA Negative Negative   Magnesium   Result Value Ref Range    Magnesium 2.0 1.6 - 2.6 mg/dL   Protime-INR   Result Value Ref Range    Prothrombin Time 10.2 9.0 - 12.5 sec    INR 1.0 0.8 - 1.2   Phosphorus   Result Value Ref Range    Phosphorus 3.2 2.7 - 4.5 mg/dL   APTT   Result Value Ref Range    aPTT 24.2 21.0 - 32.0 sec   POCT glucose   Result Value Ref Range    POC Glucose 90 70 - 110 mg/dL   POCT glucose   Result Value Ref Range    POCT Glucose 90 70 - 110 mg/dL     Component      Latest Ref Rng & Units 4/6/2018 3/5/2018   Sed Rate      0 - 20 mm/Hr 52 (H) 20     Significant Imaging: I have reviewed all pertinent imaging results/findings within the past 24 hours.   Imaging Results          CT Head Without Contrast    Per ER note, Per virtual radiology No CT evidence for acute intracranial abnormality. Cerebral atrophy with small vessel ischemic changes.              I have personally reviewed the patients labs, imaging, and discussed the patient case in detail with the Er provider    Assessment/Plan:     * Temporal arteritis, suspected    ESR  elevated  Continue Solumedrol 500mg IV BID   Monitor for visual changes   Consider Temporal Arteritis Biopsy   Neuro on Board, consult   Consider other headache types in diff.         Essential hypertension, benign    bp stable   Continue home med          Diabetes mellitus without complication    Check A1C, Lipid  Accucheck   SSI  monitor          Chronic kidney disease (CKD), stage III (moderate)    Stable monitor            VTE Risk Mitigation         Ordered     enoxaparin injection 30 mg  Daily     Route:  Subcutaneous        04/07/18 0059     IP VTE HIGH RISK PATIENT  Once     SCDS 04/07/18 0059             Noé Dhaliwal NP  Department of Hospital Medicine   [facility]